# Patient Record
Sex: FEMALE | Race: BLACK OR AFRICAN AMERICAN | NOT HISPANIC OR LATINO | Employment: OTHER | URBAN - METROPOLITAN AREA
[De-identification: names, ages, dates, MRNs, and addresses within clinical notes are randomized per-mention and may not be internally consistent; named-entity substitution may affect disease eponyms.]

---

## 2023-03-01 ENCOUNTER — HOSPITAL ENCOUNTER (INPATIENT)
Facility: HOSPITAL | Age: 73
LOS: 5 days | Discharge: HOME HEALTH | DRG: 123 | End: 2023-03-06
Attending: EMERGENCY MEDICINE | Admitting: INTERNAL MEDICINE
Payer: MEDICARE

## 2023-03-01 DIAGNOSIS — H55.09 NYSTAGMUS ASSOCIATED WITH CENTRAL VESTIBULAR DISORDER: ICD-10-CM

## 2023-03-01 DIAGNOSIS — I63.81 BASAL GANGLIA INFARCTION: Primary | ICD-10-CM

## 2023-03-01 DIAGNOSIS — I10 BENIGN ESSENTIAL HYPERTENSION: ICD-10-CM

## 2023-03-01 DIAGNOSIS — R53.1 ACUTE LEFT-SIDED WEAKNESS: ICD-10-CM

## 2023-03-01 DIAGNOSIS — I63.9 CVA (CEREBRAL VASCULAR ACCIDENT): ICD-10-CM

## 2023-03-01 DIAGNOSIS — H49.02 LEFT OCULOMOTOR NERVE PALSY: ICD-10-CM

## 2023-03-01 DIAGNOSIS — H49.00 OCULOMOTOR NERVE PALSY, UNSPECIFIED LATERALITY: ICD-10-CM

## 2023-03-01 DIAGNOSIS — R53.1 WEAKNESS: ICD-10-CM

## 2023-03-01 DIAGNOSIS — H53.2 DIPLOPIA: ICD-10-CM

## 2023-03-01 LAB
ALBUMIN SERPL-MCNC: 4.1 G/DL (ref 3.4–4.8)
ALBUMIN/GLOB SERPL: 1.1 RATIO (ref 1.1–2)
ALP SERPL-CCNC: 126 UNIT/L (ref 40–150)
ALT SERPL-CCNC: 19 UNIT/L (ref 0–55)
AMORPH PHOS CRY URNS QL MICRO: ABNORMAL /HPF
APPEARANCE UR: ABNORMAL
AST SERPL-CCNC: 19 UNIT/L (ref 5–34)
BACTERIA #/AREA URNS AUTO: ABNORMAL /HPF
BASOPHILS # BLD AUTO: 0.03 X10(3)/MCL (ref 0–0.2)
BASOPHILS NFR BLD AUTO: 0.3 %
BILIRUB UR QL STRIP.AUTO: NEGATIVE MG/DL
BILIRUBIN DIRECT+TOT PNL SERPL-MCNC: 0.3 MG/DL
BUN SERPL-MCNC: 8.7 MG/DL (ref 9.8–20.1)
CALCIUM SERPL-MCNC: 9.8 MG/DL (ref 8.4–10.2)
CHLORIDE SERPL-SCNC: 109 MMOL/L (ref 98–107)
CO2 SERPL-SCNC: 22 MMOL/L (ref 23–31)
COLOR UR AUTO: YELLOW
CREAT SERPL-MCNC: 0.74 MG/DL (ref 0.55–1.02)
EOSINOPHIL # BLD AUTO: 0.2 X10(3)/MCL (ref 0–0.9)
EOSINOPHIL NFR BLD AUTO: 2.3 %
ERYTHROCYTE [DISTWIDTH] IN BLOOD BY AUTOMATED COUNT: 12.9 % (ref 11.5–17)
GFR SERPLBLD CREATININE-BSD FMLA CKD-EPI: >60 MLS/MIN/1.73/M2
GLOBULIN SER-MCNC: 3.7 GM/DL (ref 2.4–3.5)
GLUCOSE SERPL-MCNC: 122 MG/DL (ref 82–115)
GLUCOSE UR QL STRIP.AUTO: NEGATIVE MG/DL
HCT VFR BLD AUTO: 39.5 % (ref 37–47)
HGB BLD-MCNC: 12.6 G/DL (ref 12–16)
IMM GRANULOCYTES # BLD AUTO: 0.03 X10(3)/MCL (ref 0–0.04)
IMM GRANULOCYTES NFR BLD AUTO: 0.3 %
INR BLD: 0.93 (ref 0–1.3)
KETONES UR QL STRIP.AUTO: NEGATIVE MG/DL
LEUKOCYTE ESTERASE UR QL STRIP.AUTO: NEGATIVE UNIT/L
LIPASE SERPL-CCNC: 13 U/L
LYMPHOCYTES # BLD AUTO: 2.75 X10(3)/MCL (ref 0.6–4.6)
LYMPHOCYTES NFR BLD AUTO: 31.6 %
MCH RBC QN AUTO: 29.1 PG
MCHC RBC AUTO-ENTMCNC: 31.9 G/DL (ref 33–36)
MCV RBC AUTO: 91.2 FL (ref 80–94)
MONOCYTES # BLD AUTO: 0.39 X10(3)/MCL (ref 0.1–1.3)
MONOCYTES NFR BLD AUTO: 4.5 %
NEUTROPHILS # BLD AUTO: 5.31 X10(3)/MCL (ref 2.1–9.2)
NEUTROPHILS NFR BLD AUTO: 61 %
NITRITE UR QL STRIP.AUTO: NEGATIVE
NRBC BLD AUTO-RTO: 0 %
PH UR STRIP.AUTO: 7.5 [PH]
PLATELET # BLD AUTO: 257 X10(3)/MCL (ref 130–400)
PMV BLD AUTO: 10.9 FL (ref 7.4–10.4)
POTASSIUM SERPL-SCNC: 3.6 MMOL/L (ref 3.5–5.1)
PROT SERPL-MCNC: 7.8 GM/DL (ref 5.8–7.6)
PROT UR QL STRIP.AUTO: NEGATIVE MG/DL
PROTHROMBIN TIME: 12.4 SECONDS (ref 12.5–14.5)
RBC # BLD AUTO: 4.33 X10(6)/MCL (ref 4.2–5.4)
RBC #/AREA URNS AUTO: ABNORMAL /HPF
RBC UR QL AUTO: NEGATIVE UNIT/L
SODIUM SERPL-SCNC: 141 MMOL/L (ref 136–145)
SP GR UR STRIP.AUTO: 1.01 (ref 1–1.03)
SQUAMOUS #/AREA URNS AUTO: ABNORMAL /HPF
TROPONIN I SERPL-MCNC: <0.01 NG/ML (ref 0–0.04)
TSH SERPL-ACNC: 6.92 UIU/ML (ref 0.35–4.94)
UROBILINOGEN UR STRIP-ACNC: 0.2 MG/DL
WBC # SPEC AUTO: 8.7 X10(3)/MCL (ref 4.5–11.5)
WBC #/AREA URNS AUTO: ABNORMAL /HPF

## 2023-03-01 PROCEDURE — 81001 URINALYSIS AUTO W/SCOPE: CPT

## 2023-03-01 PROCEDURE — 11000001 HC ACUTE MED/SURG PRIVATE ROOM

## 2023-03-01 PROCEDURE — 63600175 PHARM REV CODE 636 W HCPCS: Performed by: EMERGENCY MEDICINE

## 2023-03-01 PROCEDURE — 84484 ASSAY OF TROPONIN QUANT: CPT

## 2023-03-01 PROCEDURE — 84443 ASSAY THYROID STIM HORMONE: CPT

## 2023-03-01 PROCEDURE — 85025 COMPLETE CBC W/AUTO DIFF WBC: CPT

## 2023-03-01 PROCEDURE — 93010 ELECTROCARDIOGRAM REPORT: CPT | Mod: ,,, | Performed by: INTERNAL MEDICINE

## 2023-03-01 PROCEDURE — 93010 EKG 12-LEAD: ICD-10-PCS | Mod: ,,, | Performed by: INTERNAL MEDICINE

## 2023-03-01 PROCEDURE — 83690 ASSAY OF LIPASE: CPT

## 2023-03-01 PROCEDURE — 25500020 PHARM REV CODE 255: Performed by: EMERGENCY MEDICINE

## 2023-03-01 PROCEDURE — 93005 ELECTROCARDIOGRAM TRACING: CPT

## 2023-03-01 PROCEDURE — 99285 EMERGENCY DEPT VISIT HI MDM: CPT | Mod: 25

## 2023-03-01 PROCEDURE — 85610 PROTHROMBIN TIME: CPT

## 2023-03-01 PROCEDURE — 96374 THER/PROPH/DIAG INJ IV PUSH: CPT

## 2023-03-01 PROCEDURE — 80053 COMPREHEN METABOLIC PANEL: CPT

## 2023-03-01 RX ORDER — ONDANSETRON 2 MG/ML
4 INJECTION INTRAMUSCULAR; INTRAVENOUS
Status: COMPLETED | OUTPATIENT
Start: 2023-03-01 | End: 2023-03-01

## 2023-03-01 RX ORDER — LABETALOL HYDROCHLORIDE 5 MG/ML
10 INJECTION, SOLUTION INTRAVENOUS
Status: DISCONTINUED | OUTPATIENT
Start: 2023-03-01 | End: 2023-03-01

## 2023-03-01 RX ADMIN — IOPAMIDOL 100 ML: 755 INJECTION, SOLUTION INTRAVENOUS at 10:03

## 2023-03-01 RX ADMIN — ONDANSETRON 4 MG: 2 INJECTION INTRAMUSCULAR; INTRAVENOUS at 09:03

## 2023-03-02 PROBLEM — H49.00 OCULOMOTOR NERVE PALSY: Status: ACTIVE | Noted: 2023-03-02

## 2023-03-02 LAB
ANION GAP SERPL CALC-SCNC: 11 MEQ/L
APPEARANCE CSF: CLEAR
AV INDEX (PROSTH): 0.9
AV MEAN GRADIENT: 3 MMHG
AV PEAK GRADIENT: 6 MMHG
AV VELOCITY RATIO: 0.97
BUN SERPL-MCNC: 8 MG/DL (ref 9.8–20.1)
C GATTII+NEOFOR DNA CSF QL NAA+NON-PROBE: NOT DETECTED
CALCIUM SERPL-MCNC: 9.6 MG/DL (ref 8.4–10.2)
CHLORIDE SERPL-SCNC: 108 MMOL/L (ref 98–107)
CHOLEST SERPL-MCNC: 210 MG/DL
CHOLEST/HDLC SERPL: 5 {RATIO} (ref 0–5)
CMV DNA CSF QL NAA+NON-PROBE: NOT DETECTED
CO2 SERPL-SCNC: 23 MMOL/L (ref 23–31)
COLOR CSF: COLORLESS
CREAT SERPL-MCNC: 0.69 MG/DL (ref 0.55–1.02)
CREAT/UREA NIT SERPL: 12
CRP SERPL HS-MCNC: 3.97 MG/L
CV ECHO LV RWT: 0.49 CM
DOP CALC AO PEAK VEL: 1.26 M/S
DOP CALC AO VTI: 22.9 CM
DOP CALC LVOT PEAK VEL: 1.22 M/S
DOP CALCLVOT PEAK VEL VTI: 20.7 CM
E COLI K1 DNA CSF QL NAA+NON-PROBE: NOT DETECTED
E WAVE DECELERATION TIME: 214 MSEC
E/A RATIO: 0.71
E/E' RATIO: 7.88 M/S
ECHO LV POSTERIOR WALL: 1.06 CM (ref 0.6–1.1)
EJECTION FRACTION: 65 %
ERYTHROCYTE [DISTWIDTH] IN BLOOD BY AUTOMATED COUNT: 13.2 % (ref 11.5–17)
ERYTHROCYTE [SEDIMENTATION RATE] IN BLOOD: 34 MM/HR (ref 0–20)
EST. AVERAGE GLUCOSE BLD GHB EST-MCNC: 102.5 MG/DL
EV RNA CSF QL NAA+NON-PROBE: NOT DETECTED
FRACTIONAL SHORTENING: 47 % (ref 28–44)
GFR SERPLBLD CREATININE-BSD FMLA CKD-EPI: >60 MLS/MIN/1.73/M2
GLUCOSE CSF-MCNC: 66 MG/DL (ref 40–70)
GLUCOSE SERPL-MCNC: 101 MG/DL (ref 82–115)
GP B STREP DNA CSF QL NAA+NON-PROBE: NOT DETECTED
GRAM STN SPEC: NORMAL
HAEM INFLU DNA CSF QL NAA+NON-PROBE: NOT DETECTED
HBA1C MFR BLD: 5.2 %
HCT VFR BLD AUTO: 37.4 % (ref 37–47)
HDLC SERPL-MCNC: 42 MG/DL (ref 35–60)
HGB BLD-MCNC: 12.1 G/DL (ref 12–16)
HHV6 DNA CSF QL NAA+NON-PROBE: NOT DETECTED
HSV1 DNA CSF QL NAA+NON-PROBE: NOT DETECTED
HSV2 DNA CSF QL NAA+NON-PROBE: NOT DETECTED
INDIA INK PREP CSF: NEGATIVE
INTERVENTRICULAR SEPTUM: 0.99 CM (ref 0.6–1.1)
L MONOCYTOG DNA CSF QL NAA+NON-PROBE: NOT DETECTED
LDLC SERPL CALC-MCNC: 152 MG/DL (ref 50–140)
LEFT ATRIUM SIZE: 3.9 CM
LEFT ATRIUM VOLUME MOD: 26.5 CM3
LEFT INTERNAL DIMENSION IN SYSTOLE: 2.28 CM (ref 2.1–4)
LEFT VENTRICLE DIASTOLIC VOLUME: 84.9 ML
LEFT VENTRICLE SYSTOLIC VOLUME: 17.7 ML
LEFT VENTRICULAR INTERNAL DIMENSION IN DIASTOLE: 4.34 CM (ref 3.5–6)
LEFT VENTRICULAR MASS: 149.67 G
LV LATERAL E/E' RATIO: 6.3 M/S
LV SEPTAL E/E' RATIO: 10.5 M/S
LVOT MG: 3 MMHG
LVOT MV: 0.79 CM/S
MAGNESIUM SERPL-MCNC: 2.1 MG/DL (ref 1.6–2.6)
MCH RBC QN AUTO: 29.6 PG
MCHC RBC AUTO-ENTMCNC: 32.4 G/DL (ref 33–36)
MCV RBC AUTO: 91.4 FL (ref 80–94)
MV PEAK A VEL: 0.89 M/S
MV PEAK E VEL: 0.63 M/S
N MEN DNA CSF QL NAA+NON-PROBE: NOT DETECTED
NRBC BLD AUTO-RTO: 0 %
PARECHOVIRUS A RNA CSF QL NAA+NON-PROBE: NOT DETECTED
PHOSPHATE SERPL-MCNC: 3.9 MG/DL (ref 2.3–4.7)
PLATELET # BLD AUTO: 239 X10(3)/MCL (ref 130–400)
PMV BLD AUTO: 11.3 FL (ref 7.4–10.4)
POCT GLUCOSE: 103 MG/DL (ref 70–110)
POTASSIUM SERPL-SCNC: 4 MMOL/L (ref 3.5–5.1)
PROT CSF-MCNC: 25.5 MG/DL (ref 15–45)
PV PEAK VELOCITY: 0.93 CM/S
RBC # BLD AUTO: 4.09 X10(6)/MCL (ref 4.2–5.4)
RBC # CSF MANUAL: 60 /UL
S PNEUM DNA CSF QL NAA+NON-PROBE: NOT DETECTED
SODIUM SERPL-SCNC: 142 MMOL/L (ref 136–145)
SPECIMEN VOL CSF: 1 ML
T4 FREE SERPL-MCNC: 0.7 NG/DL (ref 0.7–1.48)
TDI LATERAL: 0.1 M/S
TDI SEPTAL: 0.06 M/S
TDI: 0.08 M/S
TRICUSPID ANNULAR PLANE SYSTOLIC EXCURSION: 1.57 CM
TRIGL SERPL-MCNC: 79 MG/DL (ref 37–140)
VLDLC SERPL CALC-MCNC: 16 MG/DL
VZV DNA CSF QL NAA+NON-PROBE: NOT DETECTED
WBC # CSF MANUAL: 0 /UL (ref 0–5)
WBC # SPEC AUTO: 7.8 X10(3)/MCL (ref 4.5–11.5)

## 2023-03-02 PROCEDURE — 25500020 PHARM REV CODE 255: Performed by: INTERNAL MEDICINE

## 2023-03-02 PROCEDURE — 86301 IMMUNOASSAY TUMOR CA 19-9: CPT | Performed by: INTERNAL MEDICINE

## 2023-03-02 PROCEDURE — 87205 SMEAR GRAM STAIN: CPT | Performed by: NURSE PRACTITIONER

## 2023-03-02 PROCEDURE — A9577 INJ MULTIHANCE: HCPCS | Performed by: INTERNAL MEDICINE

## 2023-03-02 PROCEDURE — 84100 ASSAY OF PHOSPHORUS: CPT | Performed by: INTERNAL MEDICINE

## 2023-03-02 PROCEDURE — 86141 C-REACTIVE PROTEIN HS: CPT | Performed by: INTERNAL MEDICINE

## 2023-03-02 PROCEDURE — 86592 SYPHILIS TEST NON-TREP QUAL: CPT | Performed by: NURSE PRACTITIONER

## 2023-03-02 PROCEDURE — 80061 LIPID PANEL: CPT | Performed by: INTERNAL MEDICINE

## 2023-03-02 PROCEDURE — 97166 OT EVAL MOD COMPLEX 45 MIN: CPT

## 2023-03-02 PROCEDURE — 63600175 PHARM REV CODE 636 W HCPCS: Performed by: INTERNAL MEDICINE

## 2023-03-02 PROCEDURE — 84157 ASSAY OF PROTEIN OTHER: CPT | Performed by: NURSE PRACTITIONER

## 2023-03-02 PROCEDURE — 25000003 PHARM REV CODE 250: Performed by: INTERNAL MEDICINE

## 2023-03-02 PROCEDURE — 83735 ASSAY OF MAGNESIUM: CPT | Performed by: INTERNAL MEDICINE

## 2023-03-02 PROCEDURE — 99223 PR INITIAL HOSPITAL CARE,LEVL III: ICD-10-PCS | Mod: ,,, | Performed by: PSYCHIATRY & NEUROLOGY

## 2023-03-02 PROCEDURE — 85651 RBC SED RATE NONAUTOMATED: CPT | Performed by: INTERNAL MEDICINE

## 2023-03-02 PROCEDURE — 83036 HEMOGLOBIN GLYCOSYLATED A1C: CPT | Performed by: INTERNAL MEDICINE

## 2023-03-02 PROCEDURE — 82378 CARCINOEMBRYONIC ANTIGEN: CPT | Performed by: INTERNAL MEDICINE

## 2023-03-02 PROCEDURE — 80048 BASIC METABOLIC PNL TOTAL CA: CPT | Performed by: INTERNAL MEDICINE

## 2023-03-02 PROCEDURE — 86304 IMMUNOASSAY TUMOR CA 125: CPT | Performed by: INTERNAL MEDICINE

## 2023-03-02 PROCEDURE — 92523 SPEECH SOUND LANG COMPREHEN: CPT

## 2023-03-02 PROCEDURE — 82945 GLUCOSE OTHER FLUID: CPT | Performed by: NURSE PRACTITIONER

## 2023-03-02 PROCEDURE — 87210 SMEAR WET MOUNT SALINE/INK: CPT | Performed by: NURSE PRACTITIONER

## 2023-03-02 PROCEDURE — 84439 ASSAY OF FREE THYROXINE: CPT | Performed by: INTERNAL MEDICINE

## 2023-03-02 PROCEDURE — 89051 BODY FLUID CELL COUNT: CPT | Performed by: NURSE PRACTITIONER

## 2023-03-02 PROCEDURE — 99223 1ST HOSP IP/OBS HIGH 75: CPT | Mod: ,,, | Performed by: PSYCHIATRY & NEUROLOGY

## 2023-03-02 PROCEDURE — 85027 COMPLETE CBC AUTOMATED: CPT | Performed by: INTERNAL MEDICINE

## 2023-03-02 PROCEDURE — 11000001 HC ACUTE MED/SURG PRIVATE ROOM

## 2023-03-02 PROCEDURE — 87102 FUNGUS ISOLATION CULTURE: CPT | Performed by: NURSE PRACTITIONER

## 2023-03-02 PROCEDURE — 87070 CULTURE OTHR SPECIMN AEROBIC: CPT | Performed by: NURSE PRACTITIONER

## 2023-03-02 PROCEDURE — 87483 CNS DNA AMP PROBE TYPE 12-25: CPT | Performed by: INTERNAL MEDICINE

## 2023-03-02 PROCEDURE — 21400001 HC TELEMETRY ROOM

## 2023-03-02 PROCEDURE — 82105 ALPHA-FETOPROTEIN SERUM: CPT | Performed by: INTERNAL MEDICINE

## 2023-03-02 RX ORDER — ASPIRIN 325 MG
325 TABLET, DELAYED RELEASE (ENTERIC COATED) ORAL ONCE
Status: COMPLETED | OUTPATIENT
Start: 2023-03-02 | End: 2023-03-02

## 2023-03-02 RX ORDER — ESOMEPRAZOLE MAGNESIUM 40 MG/1
40 CAPSULE, DELAYED RELEASE ORAL
COMMUNITY

## 2023-03-02 RX ORDER — POLYETHYLENE GLYCOL 3350 17 G/17G
17 POWDER, FOR SOLUTION ORAL 2 TIMES DAILY PRN
Status: DISCONTINUED | OUTPATIENT
Start: 2023-03-03 | End: 2023-03-06 | Stop reason: HOSPADM

## 2023-03-02 RX ORDER — ACETAMINOPHEN 325 MG/1
650 TABLET ORAL EVERY 6 HOURS PRN
Status: DISCONTINUED | OUTPATIENT
Start: 2023-03-02 | End: 2023-03-06 | Stop reason: HOSPADM

## 2023-03-02 RX ORDER — CYCLOBENZAPRINE HCL 5 MG
10 TABLET ORAL NIGHTLY PRN
COMMUNITY

## 2023-03-02 RX ORDER — FAMOTIDINE 20 MG/1
20 TABLET, FILM COATED ORAL DAILY
Status: DISCONTINUED | OUTPATIENT
Start: 2023-03-02 | End: 2023-03-02

## 2023-03-02 RX ORDER — MIRABEGRON 50 MG/1
1 TABLET, FILM COATED, EXTENDED RELEASE ORAL DAILY
COMMUNITY

## 2023-03-02 RX ORDER — CYCLOBENZAPRINE HCL 10 MG
10 TABLET ORAL NIGHTLY PRN
Status: DISCONTINUED | OUTPATIENT
Start: 2023-03-02 | End: 2023-03-06 | Stop reason: HOSPADM

## 2023-03-02 RX ORDER — ASPIRIN 81 MG/1
81 TABLET ORAL DAILY
Status: DISCONTINUED | OUTPATIENT
Start: 2023-03-02 | End: 2023-03-02

## 2023-03-02 RX ORDER — ENOXAPARIN SODIUM 100 MG/ML
40 INJECTION SUBCUTANEOUS EVERY 24 HOURS
Status: DISCONTINUED | OUTPATIENT
Start: 2023-03-02 | End: 2023-03-06 | Stop reason: HOSPADM

## 2023-03-02 RX ORDER — CHOLECALCIFEROL (VITAMIN D3) 25 MCG
2000 TABLET ORAL DAILY
COMMUNITY

## 2023-03-02 RX ORDER — PROCHLORPERAZINE EDISYLATE 5 MG/ML
5 INJECTION INTRAMUSCULAR; INTRAVENOUS EVERY 6 HOURS PRN
Status: DISCONTINUED | OUTPATIENT
Start: 2023-03-02 | End: 2023-03-06 | Stop reason: HOSPADM

## 2023-03-02 RX ORDER — LOSARTAN POTASSIUM 50 MG/1
100 TABLET ORAL DAILY
Status: DISCONTINUED | OUTPATIENT
Start: 2023-03-02 | End: 2023-03-06 | Stop reason: HOSPADM

## 2023-03-02 RX ORDER — AMLODIPINE BESYLATE 5 MG/1
10 TABLET ORAL DAILY
Status: DISCONTINUED | OUTPATIENT
Start: 2023-03-02 | End: 2023-03-06 | Stop reason: HOSPADM

## 2023-03-02 RX ORDER — LOSARTAN POTASSIUM 100 MG/1
100 TABLET ORAL DAILY
COMMUNITY

## 2023-03-02 RX ORDER — ONDANSETRON 2 MG/ML
4 INJECTION INTRAMUSCULAR; INTRAVENOUS EVERY 4 HOURS PRN
Status: DISCONTINUED | OUTPATIENT
Start: 2023-03-02 | End: 2023-03-06 | Stop reason: HOSPADM

## 2023-03-02 RX ORDER — ATORVASTATIN CALCIUM 40 MG/1
40 TABLET, FILM COATED ORAL DAILY
Status: DISCONTINUED | OUTPATIENT
Start: 2023-03-02 | End: 2023-03-06 | Stop reason: HOSPADM

## 2023-03-02 RX ORDER — AMLODIPINE BESYLATE 10 MG/1
10 TABLET ORAL DAILY
COMMUNITY

## 2023-03-02 RX ORDER — LABETALOL HYDROCHLORIDE 5 MG/ML
10 INJECTION, SOLUTION INTRAVENOUS EVERY 30 MIN PRN
Status: DISCONTINUED | OUTPATIENT
Start: 2023-03-02 | End: 2023-03-06 | Stop reason: HOSPADM

## 2023-03-02 RX ORDER — PANTOPRAZOLE SODIUM 40 MG/1
40 TABLET, DELAYED RELEASE ORAL DAILY
Status: DISCONTINUED | OUTPATIENT
Start: 2023-03-02 | End: 2023-03-06 | Stop reason: HOSPADM

## 2023-03-02 RX ORDER — SODIUM CHLORIDE 0.9 % (FLUSH) 0.9 %
10 SYRINGE (ML) INJECTION
Status: DISCONTINUED | OUTPATIENT
Start: 2023-03-02 | End: 2023-03-06 | Stop reason: HOSPADM

## 2023-03-02 RX ORDER — AMOXICILLIN 250 MG
1 CAPSULE ORAL 2 TIMES DAILY PRN
Status: DISCONTINUED | OUTPATIENT
Start: 2023-03-03 | End: 2023-03-06 | Stop reason: HOSPADM

## 2023-03-02 RX ORDER — BISACODYL 10 MG
10 SUPPOSITORY, RECTAL RECTAL DAILY PRN
Status: DISCONTINUED | OUTPATIENT
Start: 2023-03-02 | End: 2023-03-06 | Stop reason: HOSPADM

## 2023-03-02 RX ADMIN — Medication 81 MG: at 08:03

## 2023-03-02 RX ADMIN — ATORVASTATIN CALCIUM 40 MG: 40 TABLET, FILM COATED ORAL at 08:03

## 2023-03-02 RX ADMIN — ASPIRIN 325 MG: 325 TABLET, COATED ORAL at 03:03

## 2023-03-02 RX ADMIN — AMLODIPINE BESYLATE 10 MG: 5 TABLET ORAL at 08:03

## 2023-03-02 RX ADMIN — ENOXAPARIN SODIUM 40 MG: 40 INJECTION SUBCUTANEOUS at 06:03

## 2023-03-02 RX ADMIN — PANTOPRAZOLE SODIUM 40 MG: 40 TABLET, DELAYED RELEASE ORAL at 08:03

## 2023-03-02 RX ADMIN — GADOBENATE DIMEGLUMINE 17 ML: 529 INJECTION, SOLUTION INTRAVENOUS at 09:03

## 2023-03-02 RX ADMIN — LOSARTAN POTASSIUM 100 MG: 50 TABLET, FILM COATED ORAL at 08:03

## 2023-03-02 NOTE — CONSULTS
"[facility] - 9th Floor Med Surg  Neurology  Consult Note    Patient Name: Porsche Alston  MRN: [id number]  Admission Date: 3/1/2023  Hospital Length of Stay: 1 days  Code Status: Full Code   Attending Provider: Mary Cerda MD   Consulting Provider: SADIE Marti  Primary Care Physician: Mabel De NP  Principal Problem:Oculomotor nerve palsy    Inpatient consult to Neurology  Consult performed by: SADIE Marti  Consult ordered by: Mary Cerda MD         Subjective:     Chief Complaint: Double vision    HPI:   72-year-old female with past medical history of HTN, tobacco use, presented to ED on 3/1 with reports of diplopia and generalized weakness.  She also reports "dragging left side" for quite some time and started following back surgery. CTh showed left basal ganglia lacunar infarct of indeterminate age.  She was admitted to Hospitalist service and neurology was consulted for stroke workup.       History reviewed. No pertinent past medical history.    No past surgical history on file.    Review of patient's allergies indicates:  No Known Allergies    Current Neurological Medications:     No current facility-administered medications on file prior to encounter.     Current Outpatient Medications on File Prior to Encounter   Medication Sig    amLODIPine (NORVASC) 10 MG tablet Take 10 mg by mouth once daily.    cyclobenzaprine (FLEXERIL) 5 MG tablet Take 10 mg by mouth nightly as needed for Muscle spasms.    esomeprazole (NEXIUM) 40 MG capsule Take 40 mg by mouth before breakfast.    losartan (COZAAR) 100 MG tablet Take 100 mg by mouth once daily.    mirabegron (MYRBETRIQ) 50 mg Tb24 Take by mouth.    vitamin D (VITAMIN D3) 1000 units Tab Take 2,000 Units by mouth once daily.     Family History    None       Tobacco Use    Smoking status: Unknown    Smokeless tobacco: Not on file   Substance and Sexual Activity    Alcohol use: Not Currently    Drug use: Not Currently    Sexual " activity: Not on file     Review of Systems   Constitutional:  Positive for activity change.   Eyes:  Positive for visual disturbance (double vision).   Neurological:  Positive for dizziness, weakness and light-headedness.   All other systems reviewed and are negative.  Objective:     Vital Signs (Most Recent):  Temp: 98 °F (36.7 °C) (03/02/23 1132)  Pulse: 80 (03/02/23 1132)  Resp: 19 (03/02/23 1011)  BP: (!) 168/84 (03/02/23 1132)  SpO2: 99 % (03/02/23 1132)   Vital Signs (24h Range):  Temp:  [98 °F (36.7 °C)-98.5 °F (36.9 °C)] 98 °F (36.7 °C)  Pulse:  [80-94] 80  Resp:  [14-20] 19  SpO2:  [97 %-100 %] 99 %  BP: (143-187)/() 168/84     Weight: 83.9 kg (185 lb)  There is no height or weight on file to calculate BMI.    Physical Exam  HENT:      Head: Normocephalic.      Nose: Nose normal.   Eyes:      Comments: Third nerve palsy of left eye and bilateral eye torsion nystagmus   Pulmonary:      Effort: Pulmonary effort is normal.   Abdominal:      Palpations: Abdomen is soft.   Musculoskeletal:      Cervical back: Normal range of motion.   Skin:     General: Skin is warm and dry.      Capillary Refill: Capillary refill takes less than 2 seconds.   Neurological:      Mental Status: She is alert and oriented to person, place, and time.   Psychiatric:         Mood and Affect: Mood normal.         Speech: Speech normal.       NEUROLOGICAL EXAMINATION:     MENTAL STATUS   Oriented to person, place, and time.   Attention: normal. Concentration: normal.   Speech: speech is normal   Level of consciousness: alert    CRANIAL NERVES     CN III, IV, VI   CN III: left CN III palsy  Nystagmus type: torsional nystagmus of bilateral eye.  Diplopia: vertical    CN V   Facial sensation intact.     CN VII   Facial expression full, symmetric.     CN IX, X   CN IX normal.   CN X normal.     CN XI   CN XI normal.     CN XII   CN XII normal.     MOTOR EXAM   Muscle bulk: normal  Overall muscle tone: normal    Strength   Right  deltoid: 5/5  Left deltoid: 4/5  Right biceps: 5/5  Left biceps: 4/5  Right triceps: 5/5  Left triceps: 4/5  Right quadriceps: 2/5  Left quadriceps: 1/5  Right hamstrin/5  Left hamstrin/5  Right glutei: 2/5  Left glutei: 1/5    SENSORY EXAM   Light touch normal.     Significant Labs: CBC:   Recent Labs   Lab 23  0542   WBC 8.7 7.8   HGB 12.6 12.1   HCT 39.5 37.4    239     CMP:   Recent Labs   Lab 23  0542    142   K 3.6 4.0   CO2 22* 23   BUN 8.7* 8.0*   CREATININE 0.74 0.69   CALCIUM 9.8 9.6   MG  --  2.10   ALBUMIN 4.1  --    BILITOT 0.3  --    ALKPHOS 126  --    AST 19  --    ALT 19  --        Significant Imaging: I have reviewed all pertinent imaging results/findings within the past 24 hours.    Assessment and Plan:     * Oculomotor nerve palsy  Diplopia - r/o stroke    MRI brain negative for acute infarct ... does have old strokes present on imaging.  Continue ASA 81mg daily  Continue Atorvastatin 40mg daily  PT/OT/ST to evaluate  Consider LINQ placement prior to hospital discharge.    Order MRI C- spine and L-spine  LP ordered ... including cytology, syphilis VRDL    Consider CT chest due to c/o chest pain and long smoking history .... defer to primary team.      Antithrombotics for secondary stroke prevention: Antiplatelets: Aspirin: 81 mg daily    Statins for secondary stroke prevention and hyperlipidemia, if present: Statins: Atorvastatin- 40 mg daily    Aggressive risk factor modification: HTN, Smoking, HLD     Rehab efforts: The patient has been evaluated by a stroke team provider and the therapy needs have been fully considered based off the presenting complaints and exam findings. The following therapy evaluations are needed: PT evaluate and treat, OT evaluate and treat, SLP evaluate and treat    Diagnostics ordered/pending: TTE to assess cardiac function/status    Stroke workup:  -CTh:   1.  Left basal ganglia lacunar infarct of  indeterminate age.  2.  Multiple old lacunar infarcts (right basal ganglia, right external capsule the right peritrigonal location), chronic microvascular ischemia and atrophy  -CTA h/n: No flow-limiting stenosis identified  -MRI brain: No acute intracranial findings identified  -ECHO: completed, interpretation pending  -CUS: negative  -LDL: 152  -A1c: 5.2  -TSH: 6.916  -not on antiplatelet, anticoagulation, or statin therapy at home    VTE prophylaxis: Enoxaparin 40 mg SQ every 24 hours  Mechanical prophylaxis: Place SCDs    BP parameters: normalize blood pressure            VTE Risk Mitigation (From admission, onward)           Ordered     enoxaparin injection 40 mg  Daily         03/02/23 0120     Place sequential compression device  Until discontinued         03/02/23 0117                    Thank you for your consult.  Further recommendations to follow by MD.    Savi Fritz, SADIE  Neurology  [facility] - 9th Floor Med Surg

## 2023-03-02 NOTE — H&P
[facility] Hospital Medicine - H&P Note    Patient Name: Porsche Alston  : 1950  MRN: [id number]  PCP: Mabel De NP  Admitting Physician: Mary Cerda MD  Admission Class: IP- Inpatient   Length of Stay: 1  Face-to-Face encounter date: 2023  Code status: Full    Chief Complaint   Diplopia for 1 day, left-sided weakness for 1 week    History of Present Illness   This is a 72-year-old female with medical history of hypertension noncompliant with medications and chronic heavy tobacco smoker more than 45 pack year present to the ED with complaint of diplopia that started today, generalized weakness and dragging her left side for the past week.    On arrival to ED she was afebrile, hypertensive, saturating 98% on room air.  EKG showed normal sinus rhythm.  Chest x-ray unremarkable.  CT head showed left basal ganglia lacunar infarct of indeterminate age as well as multiple old lacunar infarcts involving the right basal ganglia, right external capsule and right peritrigonal location.  CTA head and neck showed no LVO.  Labs unremarkable, A1c 5.2, lipid panel pending.    Referred to hospital medicine service for further evaluation and management.    ROS   Except as documented, all other systems reviewed and negative     Past Medical History   Hypertension    Past Surgical History   Denies prior surgeries    Social History   > 45 pack year smoker  No alcohol or illicit drug use    Family History   Reviewed and negative    Allergies   Patient has no known allergies.    Home Medications     Prior to Admission medications    Medication Sig Start Date End Date Taking? Authorizing Provider   amLODIPine (NORVASC) 10 MG tablet Take 10 mg by mouth once daily.   Yes Historical Provider   cyclobenzaprine (FLEXERIL) 5 MG tablet Take 10 mg by mouth nightly as needed for Muscle spasms.   Yes Historical Provider   esomeprazole (NEXIUM) 40 MG capsule Take 40 mg by mouth before breakfast.   Yes  Historical Provider   losartan (COZAAR) 100 MG tablet Take 100 mg by mouth once daily.   Yes Historical Provider   mirabegron (MYRBETRIQ) 50 mg Tb24 Take by mouth.   Yes Historical Provider        Physical Exam   Vital Signs  Temp:  [98.3 °F (36.8 °C)]   Pulse:  [81-92]   Resp:  [16-18]   BP: (146-187)/()   SpO2:  [98 %-99 %]    General: Appears comfortable  HEENT: NC/AT  Neck:  No JVD  Chest: CTABL  CVS: Regular rhythm. Normal S1/S2.  Abdomen: nondistended, normoactive BS, soft and non-tender.  MSK: No obvious deformity or joint swelling  Skin: Warm and dry  Neuro: AAOx3, left eye deviated outward and downward, weak left hand , left leg drift 4+/5  Psych: Cooperative    Labs     Recent Labs     03/01/23 1921   WBC 8.7   RBC 4.33   HGB 12.6   HCT 39.5   MCV 91.2   MCH 29.1   MCHC 31.9*   RDW 12.9        Recent Labs     03/01/23 1921   PROTIME 12.4*   INR 0.93      Recent Labs     03/01/23 1921      K 3.6   CHLORIDE 109*   CO2 22*   BUN 8.7*   CREATININE 0.74   EGFRNORACEVR >60   GLUCOSE 122*   CALCIUM 9.8   ALBUMIN 4.1   GLOBULIN 3.7*   ALKPHOS 126   ALT 19   AST 19   BILITOT 0.3   LIPASE 13   TSH 6.916*       Recent Labs     03/01/23 1921   TROPONINI <0.010        Microbiology Results (last 7 days)       ** No results found for the last 168 hours. **           Imaging     CTA Head and Neck (xpd)         CT Head Without Contrast   Final Result      1.  Left basal ganglia lacunar infarct of indeterminate age.      2.  Multiple old lacunar infarcts, chronic microvascular ischemia and atrophy.         Electronically signed by: Geovani Jaffe   Date:    03/01/2023   Time:    19:42      X-Ray Chest 1 View   Final Result      NO ACUTE CARDIOPULMONARY PROCESS IDENTIFIED.         Electronically signed by: Geovani Jaffe   Date:    03/01/2023   Time:    19:01      MRI Brain Without Contrast    (Results Pending)     Assessment & Plan   Left 3rd oculomotor palsy  Multiple chronic lacunar  infarct  Uncontrolled hypertension  Chronic tobacco smoker    Plan:    MRI brain without contrast, echo bubble study and carotid ultrasound pending  Aspirin 325 mg x 1 then 81 mg daily  Atorvastatin 40 mg daily  Resume amlodipine and losartan in a.m./out of window for permissive hypertension.  Neurology consult  PT/OT/SLP evaluation  I spent 10 minute on smoking cessation counseling, voiced understanding and willingness to quit.  VTE Prophylaxis:  Enoxaparin 40 mg subQ daily      Mary Cerda MD  Internal Medicine

## 2023-03-02 NOTE — NURSING
Admit profile completed by admit nurse. Med reconciliation verified and as noted in chart. Initial assessment deferred to primary nurse. Pt alert x4, no distress noted, and able to answer all questions appropriately. Family member at bedside. Transport at bedside to take pt to assigned room. Pt and family updated on POC by primary nurse.

## 2023-03-02 NOTE — FIRST PROVIDER EVALUATION
"Medical screening examination initiated.  I have conducted a focused provider triage encounter, findings are as follows:    Brief history of present illness:  72 year old female presents to ER with double vision and blurred vision that started at 1400 today. Patient states she had left leg "heaviness" that started last week. Vomiting started today.     Vitals:    03/01/23 1826   BP: (!) 187/113   Pulse: 92   Resp: 18   Temp: 98.3 °F (36.8 °C)   SpO2: 98%   Weight: 83.9 kg (185 lb)       Pertinent physical exam:  Awake and alert. Left sided weakness    Brief workup plan:  Labs, CT head, CXR    Preliminary workup initiated; this workup will be continued and followed by the physician or advanced practice provider that is assigned to the patient when roomed.  "

## 2023-03-02 NOTE — PT/OT/SLP EVAL
"Occupational Therapy   Evaluation    Name: Porsche Alston  MRN: [id number]  Admitting Diagnosis: Oculomotor nerve palsy  Recent Surgery: * No surgery found *      Recommendations:     Discharge Recommendations: nursing facility, skilled, rehabilitation facility  Discharge Equipment Recommendations:     Barriers to discharge:       Assessment:     Porsche Alston is a 72 y.o. female with a medical diagnosis of Oculomotor nerve palsy, L leg heaviness, multiple chronic lacunar infarcts, uncontrolled htn. MRI negative for acute stroke.  She presents with the following performance deficits affecting function: weakness, impaired endurance, impaired self care skills, impaired functional mobility, gait instability, impaired balance, visual deficits, decreased coordination, decreased upper extremity function.    /83 HR 80  Pt lives at home with  and granddaughter where she reports she occasionally needs assist with ADLs, uses a rollator for ambulation. Today, bed mobility min A, sitting balance SBA, sit <> stand at RW with min A. Pt took side steps along EOB with min A but unable to ambulate to door or bathroom 2/2 c/o increased "heaviness" and lethargy. Pt present with diplopia. Discussed lens tapping for next session with pt and family.     Rehab Prognosis: Good; patient would benefit from acute skilled OT services to address these deficits and reach maximum level of function.       Plan:     Patient to be seen 3 x/week, 5 x/week to address the above listed problems via self-care/home management, therapeutic activities, therapeutic exercises  Plan of Care Expires:    Plan of Care Reviewed with: patient, family    Subjective     Chief Complaint: "heaviness"   Patient/Family Comments/goals: get stronger     Occupational Profile:  Living Environment: 1 story, 4 steps to enter, rail on L, tub shower   Previous level of function: indep with FM, assist for ADLs  Roles and Routines: drives   Equipment Used at Home: " rollator  Assistance upon Discharge: family     Pain/Comfort:  Pain Rating 1: 0/10    Patients cultural, spiritual, Mosque conflicts given the current situation:      Objective:     Communicated with: nrsg prior to session.  Patient found HOB elevated with   upon OT entry to room.    General Precautions: Standard,    Orthopedic Precautions:    Braces:    Respiratory Status: Room air    Occupational Performance:    Bed Mobility:    Patient completed Supine to Sit with minimum assistance  Patient completed Sit to Supine with minimum assistance    Functional Mobility/Transfers:  Patient completed Sit <> Stand Transfer with minimum assistance  with  rolling walker   Functional Mobility: sides steps along bed; unable to ambulate further 2/2 lethargy/weakness    Activities of Daily Living:  Lower Body Dressing: minimum assistance    Toileting: minimum assistance      Cognitive/Visual Perceptual:  Cognitive/Psychosocial Skills:     -       Oriented to: Person, Place, Time, and Situation   Visual/Perceptual:      -Impaired  L eye for tracking ; L lateral deviation with diplopia       Physical Exam:  Sensation:    -       Intact  Upper Extremity Strength:    -       Right Upper Extremity: WFL  -       Left Upper Extremity: -3/5  Fine Motor Coordination:    -       Impaired  Left hand thumb/finger opposition skills      AMPA 6 Click ADL:  AMPA Total Score:      Treatment & Education:  Educated pt and family of lens taping to address diplopia for next session.     Patient left HOB elevated with all lines intact, call button in reach, and nrsg and family present    GOALS:   Multidisciplinary Problems       Occupational Therapy Goals          Problem: Occupational Therapy    Goal Priority Disciplines Outcome Interventions   Occupational Therapy Goal     OT, PT/OT Ongoing, Progressing    Description: Goals to be met by: 3/16/2023     Patient will increase functional independence with ADLs by performing:    UE Dressing with  Modified [place].  LE Dressing with Modified [place].  Grooming while standing with Modified [place].  Toileting from bedside commode with Modified [place] for hygiene and clothing management.   Toilet transfer to toilet with Modified [place].  Increased functional strength to 5/5 for LUE.                         History:     History reviewed. No pertinent past medical history.    No past surgical history on file.    Time Tracking:     OT Date of Treatment:    OT Start Time: 1140  OT Stop Time: 1200  OT Total Time (min): 20 min    Billable Minutes:Evaluation Moderate Complexity     3/2/2023

## 2023-03-02 NOTE — ED PROVIDER NOTES
Encounter Date: 3/1/2023    SCRIBE #1 NOTE: I, Dariana Thu, am scribing for, and in the presence of,  Leticia Smiley MD. I have scribed the following portions of the note - Other sections scribed: HPI,ROS,PE.     History     Chief Complaint   Patient presents with    Blurred Vision     Blurry vision and vomiting started after 1pm, not feeling well for a few days, left leg heaviness since last week. Left eye vision is worse, left eye rolling to left with nstagmus Left upper extremity weaker than right in triage.     Ms. Alston is a 72-year-old female with past medical history of HTN presenting to ED with daughter nearby c/o visual changes and weakness onset today. Daughter reports she noticed pt with unsteady gait around 1500 today, states she then assessed pt. States pt's eye movements were repetitive, following pt had an episode of vomiting.Daughter then presented here concerning CVA, notes Family Hx CVA. Pt reports of double vision, dizziness, HA, and weakness to left arm and leg as well as  sensation. She denies slurred speech. Daughter denies any new medications. Pt follows with  Mabel De NP.     The history is provided by the patient. No  was used.   Neurologic Problem  The primary symptoms include headaches, dizziness, visual change, nausea and vomiting. Primary symptoms do not include fever. The symptoms began today. The symptoms are unchanged. The neurological symptoms are multifocal.   The headache began today. Headache is a new problem. The headache is associated with double vision, visual change and weakness. The headache is not associated with eye pain.   Dizziness also occurs with nausea, vomiting and weakness. Dizziness does not occur with diaphoresis.   The visual change began today. The visual change has been unchanged since its onset. The visual change includes double vision.   Nausea began today.   Additional symptoms include weakness. Medical issues also  include hypertension.   Review of patient's allergies indicates:  No Known Allergies  History reviewed. No pertinent past medical history.  No past surgical history on file.  No family history on file.  Social History     Tobacco Use    Smoking status: Unknown   Substance Use Topics    Alcohol use: Not Currently    Drug use: Not Currently     Review of Systems   Constitutional:  Positive for activity change. Negative for chills, diaphoresis and fever.   HENT:  Negative for congestion, ear pain, sinus pain and sore throat.    Eyes:  Positive for double vision and visual disturbance. Negative for pain and discharge.   Respiratory:  Negative for cough, shortness of breath, wheezing and stridor.    Cardiovascular:  Negative for chest pain and palpitations.   Gastrointestinal:  Positive for nausea and vomiting. Negative for abdominal pain, constipation, diarrhea and rectal pain.   Genitourinary:  Negative for dysuria and hematuria.   Musculoskeletal:  Negative for back pain and myalgias.   Skin:  Negative for rash.   Neurological:  Positive for dizziness, weakness and headaches. Negative for syncope.   Hematological: Negative.    Psychiatric/Behavioral: Negative.     All other systems reviewed and are negative.    Physical Exam     Initial Vitals [03/01/23 1826]   BP Pulse Resp Temp SpO2   (!) 187/113 92 18 98.3 °F (36.8 °C) 98 %      MAP       --         Physical Exam    Nursing note and vitals reviewed.  Constitutional: She appears well-developed and well-nourished. She is not diaphoretic.   HENT:   Head: Normocephalic and atraumatic.   Nose: Nose normal.   Mouth/Throat: Oropharynx is clear and moist.   Eyes: Conjunctivae are normal. Pupils are equal, round, and reactive to light. Left eye exhibits nystagmus.   Significant rhythmic nystagmus. Normal IOP.   Neck: Trachea normal. Neck supple.   Normal range of motion.  Cardiovascular:  Normal rate, regular rhythm, normal heart sounds and intact distal pulses.            No murmur heard.  Pulmonary/Chest: Breath sounds normal. No respiratory distress. She has no wheezes. She has no rhonchi. She has no rales. She exhibits no tenderness.   Abdominal: Abdomen is soft. Bowel sounds are normal. She exhibits no distension. There is no abdominal tenderness. There is no rebound.   Musculoskeletal:         General: No tenderness or edema. Normal range of motion.      Cervical back: Normal range of motion and neck supple.      Lumbar back: Normal. Normal range of motion.     Neurological: She is alert and oriented to person, place, and time. A sensory deficit is present. GCS score is 15. GCS eye subscore is 4. GCS verbal subscore is 5. GCS motor subscore is 6.   Pateint has no facial droop. The right upper extremity greater than left upper extremity with decreased sensation. Poor coordination of finger to nose. Heel to shin limited by strength.    Skin: Skin is warm and dry. Capillary refill takes less than 2 seconds. No rash and no abscess noted. No erythema. No pallor.   Psychiatric: She has a normal mood and affect. Her behavior is normal. Judgment and thought content normal.       ED Course   Procedures  Labs Reviewed   COMPREHENSIVE METABOLIC PANEL - Abnormal; Notable for the following components:       Result Value    Chloride 109 (*)     Carbon Dioxide 22 (*)     Glucose Level 122 (*)     Blood Urea Nitrogen 8.7 (*)     Protein Total 7.8 (*)     Globulin 3.7 (*)     All other components within normal limits   PROTIME-INR - Abnormal; Notable for the following components:    PT 12.4 (*)     All other components within normal limits   URINALYSIS, REFLEX TO URINE CULTURE - Abnormal; Notable for the following components:    Appearance, UA Cloudy (*)     All other components within normal limits   CBC WITH DIFFERENTIAL - Abnormal; Notable for the following components:    MCHC 31.9 (*)     MPV 10.9 (*)     All other components within normal limits   TSH - Abnormal; Notable for the following  components:    Thyroid Stimulating Hormone 6.916 (*)     All other components within normal limits   URINALYSIS, MICROSCOPIC - Abnormal; Notable for the following components:    Amorphous Phosphate Crystals, UA Occasional (*)     All other components within normal limits   TROPONIN I - Normal   LIPASE - Normal   CBC W/ AUTO DIFFERENTIAL    Narrative:     The following orders were created for panel order CBC Auto Differential.  Procedure                               Abnormality         Status                     ---------                               -----------         ------                     CBC with Differential[[id number]]        Abnormal            Final result                 Please view results for these tests on the individual orders.   T4, FREE   HEMOGLOBIN A1C   HIGH SENSITIVITY CRP   SEDIMENTATION RATE   BASIC METABOLIC PANEL   PHOSPHORUS   MAGNESIUM   CBC WITHOUT DIFFERENTIAL   POCT GLUCOSE, HAND-HELD DEVICE     EKG Readings: (Independently Interpreted)   Initial Reading: No STEMI. Ectopy: No Ectopy. Conduction: Normal. ST Segments: Normal ST Segments. T Waves: Normal.   EKG done at 1834: Sinus rhythm at 87bpm     Imaging Results              CTA Head and Neck (xpd) (Preliminary result)  Result time 03/01/23 23:07:31      Preliminary result by Reggie Nolasco Jr., MD (03/01/23 23:07:31)                   Narrative:    START OF REPORT:  TECHNIQUE: CT ANGIOGRAM OF THE INTRACRANIAL VESSELS WAS PERFORMED WITHOUT AND WITH INTRAVENOUS CONTRAST WITH DIRECT AXIAL AS WELL AS SAGITTAL AND CORONAL REFORMATIONS. CT ANGIOGRAM OF THE NECK VESSELS WAS PERFORMED WITHOUT AND WITH INTRAVENOUS CONTRAST WITH DIRECT AXIAL AS WELL AS SAGITTAL AND CORONAL REFORMATIONS.    COMPARISON: COMPARISON IS WITH STUDY DATED2023-03-01 19:21:19.    CLINICAL HISTORY: BLURRED VISION (BLURRY VISION AND VOMITING STARTED AFTER 1PM, NOT FEELING WELL FOR A FEW DAYS, LEFT LEG HEAVINESS SINCE LAST WEEK. LEFT EYE VISION IS WORSE, LEFT EYE  ROLLING TO LEFT WITH NSTAGMUS LEFT UPPER EXTREMITY WEAKER THAN RIGHT IN TRIAGE.).    Findings:  Intracranial Vascular structures:  Internal carotid arteries: Mild atheromatous calcification of the cavernous and supraclinoid segments of the internal carotid artery is seen with mild stenosis of the left supraclinoid segment (series 17, image 259).  Middle cerebral arteries: Unremarkable.  Anterior cerebral arteries: Unremarkable.  Vertebral arteries: Unremarkable.  Basilar artery: Unremarkable.  Posterior cerebral arteries: There is fetal origin of the right posterior cerebral artery with a hypoplastic P1 segment. The left posterior cerebral artery is unremarkable.  Neck Vascular structures: Mild atheromatous calcification of the arch of the aorta is seen.  Carotids:  Common carotid arteries: Unremarkable.  Internal carotid artery: Unremarkable.  Vertebral arteries: Unremarkable.  Brain parenchyma: No abnormal intracranial enhancement is seen on the post contrast images. Again noted are old lacunar infarcts in bilateral basal ganglia.    Miscellaneous: Enlarged thyroid gland with calcified and non-calcified nodules.      Impression:  1. Unremarkable CT angiogram of the head and neck. Details and other findings as described above.                          Preliminary result by [facility], Rad Results In (03/01/23 23:07:31)                   Narrative:    START OF REPORT:  TECHNIQUE: CT ANGIOGRAM OF THE INTRACRANIAL VESSELS WAS PERFORMED WITHOUT AND WITH INTRAVENOUS CONTRAST WITH DIRECT AXIAL AS WELL AS SAGITTAL AND CORONAL REFORMATIONS. CT ANGIOGRAM OF THE NECK VESSELS WAS PERFORMED WITHOUT AND WITH INTRAVENOUS CONTRAST WITH DIRECT AXIAL AS WELL AS SAGITTAL AND CORONAL REFORMATIONS.    COMPARISON: COMPARISON IS WITH STUDY DATED2023-03-01 19:21:19.    CLINICAL HISTORY: BLURRED VISION (BLURRY VISION AND VOMITING STARTED AFTER 1PM, NOT FEELING WELL FOR A FEW DAYS, LEFT LEG HEAVINESS SINCE LAST WEEK. LEFT EYE VISION IS  WORSE, LEFT EYE ROLLING TO LEFT WITH NSTAGMUS LEFT UPPER EXTREMITY WEAKER THAN RIGHT IN TRIAGE.).    Findings:  Intracranial Vascular structures:  Internal carotid arteries: Mild atheromatous calcification of the cavernous and supraclinoid segments of the internal carotid artery is seen with mild stenosis of the left supraclinoid segment (series 17, image 259).  Middle cerebral arteries: Unremarkable.  Anterior cerebral arteries: Unremarkable.  Vertebral arteries: Unremarkable.  Basilar artery: Unremarkable.  Posterior cerebral arteries: There is fetal origin of the right posterior cerebral artery with a hypoplastic P1 segment. The left posterior cerebral artery is unremarkable.  Neck Vascular structures: Mild atheromatous calcification of the arch of the aorta is seen.  Carotids:  Common carotid arteries: Unremarkable.  Internal carotid artery: Unremarkable.  Vertebral arteries: Unremarkable.  Brain parenchyma: No abnormal intracranial enhancement is seen on the post contrast images. Again noted are old lacunar infarcts in bilateral basal ganglia.    Miscellaneous: Enlarged thyroid gland with calcified and non-calcified nodules.      Impression:  1. Unremarkable CT angiogram of the head and neck. Details and other findings as described above.                                         CT Head Without Contrast (Final result)  Result time 03/01/23 19:42:32      Final result by Geovani aJffe MD (03/01/23 19:42:32)                   Impression:      1.  Left basal ganglia lacunar infarct of indeterminate age.    2.  Multiple old lacunar infarcts, chronic microvascular ischemia and atrophy.      Electronically signed by: Geovani Jaffe  Date:    03/01/2023  Time:    19:42               Narrative:    EXAMINATION:  CT HEAD WITHOUT CONTRAST    CLINICAL HISTORY:  Neuro deficit, acute, stroke suspected;    TECHNIQUE:  Sequential axial images were performed of the brain without contrast.    Dose product length of 995 mGycm.  Automated exposure control was utilized to minimize radiation dose.    COMPARISON:  None available.    FINDINGS:  There is no intracranial mass effect, midline shift, hydrocephalus or hemorrhage. There is no sulcal effacement or low attenuation changes to suggest recent large vessel territory infarction.  There are old infarcts involve the right basal ganglia, right external capsule the right peritrigonal location.  There is also left basal ganglia lacunar infarct of indeterminate age.  Chronic appearing periventricular and subcortical white matter low attenuation changes are present and are consistent with chronic microangiopathic ischemia. The ventricular system and sulcal markings prominence is consistent with atrophy. There is no acute extra axial fluid collection. Visualized paranasal sinuses are clear without mucosal thickening, polypoidal abnormality or air-fluid levels. Mastoid air cells aeration is optimal.                                       X-Ray Chest 1 View (Final result)  Result time 03/01/23 19:01:11      Final result by Geovani Jaffe MD (03/01/23 19:01:11)                   Impression:      NO ACUTE CARDIOPULMONARY PROCESS IDENTIFIED.      Electronically signed by: Geovani Jaffe  Date:    03/01/2023  Time:    19:01               Narrative:    EXAMINATION:  XR CHEST 1 VIEW    CLINICAL HISTORY:  Weakness    TECHNIQUE:  One view    COMPARISON:  None available.    FINDINGS:  Cardiopericardial silhouette is mildly enlarged.  Lungs are without dense focal or segmental consolidation, congestive process, pleural effusions or pneumothorax.                                    X-Rays:   Independently Interpreted Readings:   Chest X-Ray: Normal heart size.  No infiltrates.  No acute abnormalities.   Medications   aspirin EC tablet 325 mg (has no administration in time range)   sodium chloride 0.9% flush 10 mL (has no administration in time range)   acetaminophen tablet 650 mg (has no administration in time  range)   labetaloL injection 10 mg (has no administration in time range)   ondansetron injection 4 mg (has no administration in time range)   prochlorperazine injection Soln 5 mg (has no administration in time range)   polyethylene glycol packet 17 g (has no administration in time range)   senna-docusate 8.6-50 mg per tablet 1 tablet (has no administration in time range)   bisacodyL suppository 10 mg (has no administration in time range)   aspirin EC tablet 81 mg (has no administration in time range)   atorvastatin tablet 40 mg (has no administration in time range)   enoxaparin injection 40 mg (has no administration in time range)   famotidine tablet 20 mg (has no administration in time range)   ondansetron injection 4 mg (4 mg Intravenous Given 3/1/23 2145)   iopamidoL (ISOVUE-370) injection 100 mL (100 mLs Intravenous Given 3/1/23 2209)     Medical Decision Making:   History:   I obtained history from: someone other than patient.       <> Summary of History: Daughter at bedside who is an ICU nurse  Initial Assessment:   See HPI  Independently Interpreted Test(s):   I have ordered and independently interpreted EKG Reading(s) - see prior notes  Clinical Tests:   Lab Tests: Ordered and Reviewed  The following lab test(s) were unremarkable: CBC, CMP, PT and PTT  Radiological Study: Ordered and Reviewed  Medical Tests: Ordered and Reviewed  Other:   I have discussed this case with another health care provider.Medical Decision Making  The differential diagnosis includes, but is not limited to: CVA, TIA, electrolyte derangement, intracranial hemorrhage, anemia, glaucoma   To evaluate this CT head, CBC, CMP, coags, TSH, troponin, UA, CT angio head and neck and chest x-ray were ordered and reviewed.  She took a full-dose aspirin prior to arrival in the ED. timeline is somewhat cloudy as she did have some issues ambulating overnight and was out of the window for tPA on arrival.  Was possibly within the window for LVF  however CT angiogram is without acute large vessel occlusion and her original CT showed an infarct.  She will be admitted for routine stroke care and permissive hypertension.  Her labs showed an elevated TSH of undetermined etiology or significance slightly low bicarb and slightly elevated glucose.  Remainder of labs were noncontributory and overall within normal limits    Problems Addressed:  Acute left-sided weakness: acute illness or injury that poses a threat to life or bodily functions  Basal ganglia infarction: acute illness or injury that poses a threat to life or bodily functions  Benign essential hypertension: chronic illness or injury with severe exacerbation, progression, or side effects of treatment  CVA (cerebral vascular accident): acute illness or injury  Diplopia: acute illness or injury that poses a threat to life or bodily functions  Nystagmus associated with central vestibular disorder: acute illness or injury that poses a threat to life or bodily functions  Weakness: acute illness or injury that poses a threat to life or bodily functions    Amount and/or Complexity of Data Reviewed  Labs: ordered. Decision-making details documented in ED Course.  Radiology: ordered. Decision-making details documented in ED Course.  ECG/medicine tests: ordered and independent interpretation performed.    Risk  Prescription drug management.  Decision regarding hospitalization.       Additional MDM:     NIH Stroke Scale:   Interval = baseline (upon arrival/admit)  Level of consciousness = 0 - alert  LOC questions = 0 - answers both correctly  LOC commands = 0 - performs both correctly  Best gaze = 1 - partial gaze palsy  Visual = 0 - no visual loss  Facial palsy = 0 - normal  Motor left arm =  1 - drift  Motor right arm =  0 - no drift  Motor left leg = 2 - can't resist gravity  Motor right leg =  0 - no drift  Limb ataxia = 2 - present in two limbs  Sensory = 1 - mild to moderate loss  Best language = 0 - no  aphasia  Dysarthria = 0 - normal articulation  Extinction and inattention = 0 - no neglect  NIH Stroke Scale Total = 7     Scribe Attestation:   Scribe #1: I performed the above scribed service and the documentation accurately describes the services I performed. I attest to the accuracy of the note.  Comments: Attending:   Physician Attestation Statement for Scribe #1: ILeticia MD, personally performed the services described in this documentation. All medical record entries made by the scribe were at my direction and in my presence.  I have reviewed the chart and agree that the record reflects my personal performance and is accurate and complete.        Attending Attestation:           Physician Attestation for Scribe:  Physician Attestation Statement for Scribe #1: I, Leticia Smiley MD, reviewed documentation, as scribed by Dariana Andino in my presence, and it is both accurate and complete.           ED Course as of 03/02/23 0214   Wed Mar 01, 2023   2156 Patient is within the 24 hour window for LVO potentially; howver, she already has an infarct on her ct [BS]   2303 Took full dose asa pta [BS]   2356 Hospitalist accepted admit [BS]      ED Course User Index  [BS] Leticia Smiley MD                   Clinical Impression:   Final diagnoses:  [R53.1] Weakness  [I63.81] Basal ganglia infarction (Primary)  [I10] Benign essential hypertension  [H53.2] Diplopia  [H55.09] Nystagmus associated with central vestibular disorder  [R53.1] Acute left-sided weakness        ED Disposition Condition    Admit Stable                Leticia Smiley MD  03/02/23 0214

## 2023-03-02 NOTE — PT/OT/SLP EVAL
"Speech Language Pathology Department  Cognitive-Communication Evaluation    Patient Name:  Porsche Alston   MRN:  [id number]    Recommendations:     General recommendations:  SLP intervention not indicated  Communication strategies:  none    History:     Porsche Alston is a/n 72 y.o. female admitted for CVA workup.    History reviewed. No pertinent past medical history.  No past surgical history on file.    Previous level of Function  Education:high school  Occupation: retired  Lives: with spouse  Handed: Right  Glasses: no  Hearing Aids: no    Subjective     Patient awake, alert, and oriented x4 .  Patient goals: "to get better"  Spiritual/Cultural/Faith Beliefs/Practices that affect care: no  Pain/Comfort: Pain Rating 1: 0/10    Objective:     SPEECH PRODUCTION  Phoneme Production: wfl  Voice Quality: wfl  Voice Production: wfl  Speech Rate: wfl  Loudness: wfl  Respiration: wfl  Speech Intelligibility  Known Context: Greater that 90%  Unknown Context: Greater that 90%    AUDITORY COMPREHENSION  Identification:  Body parts: 100%  Objects: 100%  Following Directions:  1-Step: 100%  2-Step: 100%  Yes/No Questions:  Biographical: 100%  Environmental: 100%  Simple: 100%  Complex: 100%    VERBAL EXPRESSION  Automatic Speech:  Functional needs: 100%  Days of the week: 100%    Confrontation Naming  Body Parts: 100%  Objects: 100%  Wh- Questions:  Object name: 100%  Object function: 100%    COGNITION  Orientation:  Person: 100%  Place: 100%  Time: 100%  Memory:  Immediate: 100%  Delayed: 100%  Problem Solving  Functional simple: 100%  Functional complex: 100%  Organization:  Convergent thinkin%  Divergent thinkin%      Assessment:     Pt presents with functional speech and language skills,cognitive linguistic skills note to be at baseline. No skilled SLP intervention is warranted at this time.     Goals:   Multidisciplinary Problems       SLP Goals       Not on file                  Patient Education:     Patient " provided with verbal education regarding POC.  Understanding was verbalized.    Plan:     SLP Follow-Up:      Patient to be seen:      Plan of Care expires:     Plan of Care reviewed with:  patient      Time Tracking:     SLP Treatment Date:    3/2/23  Speech Start Time:   0820  Speech Stop Time:      0835  Speech Total Time (min):   15    Billable minutes:  Evaluation of Speech Sound Production with Comprehension and Expression, 15      03/02/2023

## 2023-03-02 NOTE — PROGRESS NOTES
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 Flexeril, losartan, ppi  On Lovenox for DVT prophylaxis

## 2023-03-02 NOTE — HPI
"72-year-old female with past medical history of HTN, tobacco use, presented to ED on 3/1 with reports of diplopia and generalized weakness.  She also reports "dragging left side" for quite some time and started following back surgery. CTh showed left basal ganglia lacunar infarct of indeterminate age.  She was admitted to Hospitalist service and neurology was consulted for stroke workup.  "

## 2023-03-02 NOTE — ASSESSMENT & PLAN NOTE
Diplopia - r/o stroke    MRI brain negative for acute infarct ... does have old strokes present on imaging.  Continue ASA 81mg daily  Continue Atorvastatin 40mg daily  PT/OT/ST to evaluate      Antithrombotics for secondary stroke prevention: Antiplatelets: Aspirin: 81 mg daily    Statins for secondary stroke prevention and hyperlipidemia, if present: Statins: Atorvastatin- 40 mg daily    Aggressive risk factor modification: HTN, Smoking, HLD     Rehab efforts: The patient has been evaluated by a stroke team provider and the therapy needs have been fully considered based off the presenting complaints and exam findings. The following therapy evaluations are needed: PT evaluate and treat, OT evaluate and treat, SLP evaluate and treat    Diagnostics ordered/pending: TTE to assess cardiac function/status    Stroke workup:  -CTh:   1.  Left basal ganglia lacunar infarct of indeterminate age.  2.  Multiple old lacunar infarcts (right basal ganglia, right external capsule the right peritrigonal location), chronic microvascular ischemia and atrophy  -CTA h/n: No flow-limiting stenosis identified  -MRI brain: No acute intracranial findings identified  -ECHO: completed, interpretation pending  -CUS: negative  -LDL: 152  -A1c: 5.2  -TSH: 6.916  -not on antiplatelet, anticoagulation, or statin therapy at home    VTE prophylaxis: Enoxaparin 40 mg SQ every 24 hours  Mechanical prophylaxis: Place SCDs    BP parameters: normalize blood pressure

## 2023-03-02 NOTE — PLAN OF CARE
03/02/23 1323   Discharge Assessment   Assessment Type Discharge Planning Assessment   Confirmed/corrected address, phone number and insurance Yes   Confirmed Demographics Correct on Facesheet   Source of Information patient   People in Home spouse   Do you expect to return to your current living situation? Yes   Do you have help at home or someone to help you manage your care at home? Yes   Prior to hospitilization cognitive status: Alert/Oriented   Current cognitive status: Alert/Oriented   Walking or Climbing Stairs ambulation difficulty, requires equipment   Mobility Management Rollator   Home Accessibility stairs to enter home   Number of Stairs, Main Entrance four   Home Layout Able to live on 1st floor   Equipment Currently Used at Home walker, rolling   Readmission within 30 days? No   Patient currently being followed by outpatient case management? No   Do you currently have service(s) that help you manage your care at home? No   Do you take prescription medications? Yes   Do you have prescription coverage? Yes   Do you have any problems affording any of your prescribed medications? No   Is the patient taking medications as prescribed? yes   How do you get to doctors appointments? car, drives self   Are you on dialysis? No   Do you take coumadin? No   Discharge Plan A Home Health   Discharge Plan B Home Health   DME Needed Upon Discharge  none   Discharge Plan discussed with: Patient   Discharge Barriers Identified None     Patient lives with spouse in a single story home with 4 steps with railing upon entrance. Patient reports spouse has illnesses. Patient has daughters that lives nearby but have young children making it hard for them to assist patient. Patient was driving prior to admission however, concerns now regarding vision. DME in home: Rollator. PCP: Mabel De NP, Pharmacy: [facility]

## 2023-03-02 NOTE — SUBJECTIVE & OBJECTIVE
History reviewed. No pertinent past medical history.    No past surgical history on file.    Review of patient's allergies indicates:  No Known Allergies    Current Neurological Medications:     No current facility-administered medications on file prior to encounter.     Current Outpatient Medications on File Prior to Encounter   Medication Sig    amLODIPine (NORVASC) 10 MG tablet Take 10 mg by mouth once daily.    cyclobenzaprine (FLEXERIL) 5 MG tablet Take 10 mg by mouth nightly as needed for Muscle spasms.    esomeprazole (NEXIUM) 40 MG capsule Take 40 mg by mouth before breakfast.    losartan (COZAAR) 100 MG tablet Take 100 mg by mouth once daily.    mirabegron (MYRBETRIQ) 50 mg Tb24 Take by mouth.    vitamin D (VITAMIN D3) 1000 units Tab Take 2,000 Units by mouth once daily.     Family History    None       Tobacco Use    Smoking status: Unknown    Smokeless tobacco: Not on file   Substance and Sexual Activity    Alcohol use: Not Currently    Drug use: Not Currently    Sexual activity: Not on file     Review of Systems   Constitutional:  Positive for activity change.   Eyes:  Positive for visual disturbance (double vision).   Neurological:  Positive for dizziness, weakness and light-headedness.   All other systems reviewed and are negative.  Objective:     Vital Signs (Most Recent):  Temp: 98 °F (36.7 °C) (03/02/23 1132)  Pulse: 80 (03/02/23 1132)  Resp: 19 (03/02/23 1011)  BP: (!) 168/84 (03/02/23 1132)  SpO2: 99 % (03/02/23 1132)   Vital Signs (24h Range):  Temp:  [98 °F (36.7 °C)-98.5 °F (36.9 °C)] 98 °F (36.7 °C)  Pulse:  [80-94] 80  Resp:  [14-20] 19  SpO2:  [97 %-100 %] 99 %  BP: (143-187)/() 168/84     Weight: 83.9 kg (185 lb)  There is no height or weight on file to calculate BMI.    Physical Exam  HENT:      Head: Normocephalic.      Nose: Nose normal.   Eyes:      Comments: Third nerve palsy of left eye and right eye torsion, associated with double vision, light headedness, and nausea.    Pulmonary:      Effort: Pulmonary effort is normal.   Abdominal:      Palpations: Abdomen is soft.   Musculoskeletal:      Cervical back: Normal range of motion.   Skin:     General: Skin is warm and dry.      Capillary Refill: Capillary refill takes less than 2 seconds.   Neurological:      Mental Status: She is alert and oriented to person, place, and time.   Psychiatric:         Mood and Affect: Mood normal.         Speech: Speech normal.       NEUROLOGICAL EXAMINATION:     MENTAL STATUS   Oriented to person, place, and time.   Attention: normal. Concentration: normal.   Speech: speech is normal   Level of consciousness: alert    CRANIAL NERVES     CN III, IV, VI   CN III: left CN III palsy  Nystagmus type: torsional nystagmus of right eye.  Diplopia: vertical    CN V   Facial sensation intact.     CN VII   Facial expression full, symmetric.     CN IX, X   CN IX normal.   CN X normal.     CN XI   CN XI normal.     CN XII   CN XII normal.     MOTOR EXAM   Muscle bulk: normal  Overall muscle tone: normal    Strength   Right deltoid: 5/5  Left deltoid: 4/5  Right biceps: 5/5  Left biceps: 4/5  Right triceps: 5/5  Left triceps: 4/5  Right quadriceps: 2/5  Left quadriceps: 1/5  Right hamstrin/5  Left hamstrin/5  Right glutei: 2/5  Left glutei: 1/5    SENSORY EXAM   Light touch normal.     Significant Labs: CBC:   Recent Labs   Lab 23  0542   WBC 8.7 7.8   HGB 12.6 12.1   HCT 39.5 37.4    239     CMP:   Recent Labs   Lab 23  0542    142   K 3.6 4.0   CO2 22* 23   BUN 8.7* 8.0*   CREATININE 0.74 0.69   CALCIUM 9.8 9.6   MG  --  2.10   ALBUMIN 4.1  --    BILITOT 0.3  --    ALKPHOS 126  --    AST 19  --    ALT 19  --        Significant Imaging: I have reviewed all pertinent imaging results/findings within the past 24 hours.

## 2023-03-02 NOTE — PLAN OF CARE
Problem: Occupational Therapy  Goal: Occupational Therapy Goal  Description: Goals to be met by: 3/16/2023     Patient will increase functional independence with ADLs by performing:    UE Dressing with Modified [place].  LE Dressing with Modified [place].  Grooming while standing with Modified [place].  Toileting from bedside commode with Modified [place] for hygiene and clothing management.   Toilet transfer to toilet with Modified [place].  Increased functional strength to 5/5 for LUE.    Outcome: Ongoing, Progressing

## 2023-03-02 NOTE — NURSING
Nurses Note -- 4 Eyes      3/2/2023   2:11 PM      Skin assessed during: Admit      [x] No Pressure Injuries Present    []Prevention Measures Documented      [] Yes- Altered Skin Integrity Present or Discovered   [] LDA Added if Not in Epic (Describe Wound)   [] New Altered Skin Integrity was Present on Admit and Documented in LDA   [] Wound Image Taken    Wound Care Consulted? No    Attending Nurse:  Heike Garcia LPN     Second RN/Staff Member: ian Berntsein CNA

## 2023-03-03 LAB
AFP-TM SERPL-MCNC: <2 NG/ML
CANCER AG125 SERPL-ACNC: 7.1 UNIT/ML (ref 0–35)
CANCER AG19-9 SERPL-ACNC: <2.06 UNIT/ML (ref 0–37)
CEA SERPL-MCNC: <1.73 NG/ML (ref 0–3)
LEFT CCA DIST DIAS: 17 CM/S
LEFT CCA DIST SYS: 80 CM/S
LEFT CCA PROX DIAS: 16 CM/S
LEFT CCA PROX SYS: 96 CM/S
LEFT ECA DIAS: 9 CM/S
LEFT ECA SYS: 47 CM/S
LEFT ICA DIST DIAS: 17 CM/S
LEFT ICA DIST SYS: 53 CM/S
LEFT ICA MID DIAS: 15 CM/S
LEFT ICA MID SYS: 48 CM/S
LEFT ICA PROX DIAS: 12 CM/S
LEFT ICA PROX SYS: 38 CM/S
LEFT VERTEBRAL DIAS: 12 CM/S
LEFT VERTEBRAL SYS: 54 CM/S
OHS CV CAROTID RIGHT ICA EDV HIGHEST: 18
OHS CV CAROTID ULTRASOUND LEFT ICA/CCA RATIO: 0.66
OHS CV CAROTID ULTRASOUND RIGHT ICA/CCA RATIO: 0.8
OHS CV PV CAROTID LEFT HIGHEST CCA: 96
OHS CV PV CAROTID LEFT HIGHEST ICA: 53
OHS CV PV CAROTID RIGHT HIGHEST CCA: 69
OHS CV PV CAROTID RIGHT HIGHEST ICA: 55
OHS CV US CAROTID LEFT HIGHEST EDV: 17
RIGHT CCA DIST DIAS: 17 CM/S
RIGHT CCA DIST SYS: 69 CM/S
RIGHT CCA PROX DIAS: 11 CM/S
RIGHT CCA PROX SYS: 58 CM/S
RIGHT ECA DIAS: 8 CM/S
RIGHT ECA SYS: 36 CM/S
RIGHT ICA DIST DIAS: 16 CM/S
RIGHT ICA DIST SYS: 50 CM/S
RIGHT ICA MID DIAS: 18 CM/S
RIGHT ICA MID SYS: 55 CM/S
RIGHT ICA PROX DIAS: 10 CM/S
RIGHT ICA PROX SYS: 32 CM/S
RIGHT VERTEBRAL DIAS: 0 CM/S
RIGHT VERTEBRAL SYS: 39 CM/S

## 2023-03-03 PROCEDURE — 99233 SBSQ HOSP IP/OBS HIGH 50: CPT | Mod: ,,, | Performed by: PSYCHIATRY & NEUROLOGY

## 2023-03-03 PROCEDURE — 21400001 HC TELEMETRY ROOM

## 2023-03-03 PROCEDURE — 25000003 PHARM REV CODE 250: Performed by: NURSE PRACTITIONER

## 2023-03-03 PROCEDURE — 97162 PT EVAL MOD COMPLEX 30 MIN: CPT

## 2023-03-03 PROCEDURE — 99233 PR SUBSEQUENT HOSPITAL CARE,LEVL III: ICD-10-PCS | Mod: ,,, | Performed by: PSYCHIATRY & NEUROLOGY

## 2023-03-03 PROCEDURE — 63600175 PHARM REV CODE 636 W HCPCS: Performed by: INTERNAL MEDICINE

## 2023-03-03 PROCEDURE — 25500020 PHARM REV CODE 255: Performed by: INTERNAL MEDICINE

## 2023-03-03 PROCEDURE — 25000003 PHARM REV CODE 250: Performed by: INTERNAL MEDICINE

## 2023-03-03 PROCEDURE — 97535 SELF CARE MNGMENT TRAINING: CPT

## 2023-03-03 RX ORDER — ASPIRIN 81 MG/1
81 TABLET ORAL DAILY
Status: DISCONTINUED | OUTPATIENT
Start: 2023-03-03 | End: 2023-03-06 | Stop reason: HOSPADM

## 2023-03-03 RX ADMIN — Medication 81 MG: at 04:03

## 2023-03-03 RX ADMIN — ATORVASTATIN CALCIUM 40 MG: 40 TABLET, FILM COATED ORAL at 09:03

## 2023-03-03 RX ADMIN — ONDANSETRON 4 MG: 2 INJECTION INTRAMUSCULAR; INTRAVENOUS at 09:03

## 2023-03-03 RX ADMIN — ENOXAPARIN SODIUM 40 MG: 40 INJECTION SUBCUTANEOUS at 05:03

## 2023-03-03 RX ADMIN — PANTOPRAZOLE SODIUM 40 MG: 40 TABLET, DELAYED RELEASE ORAL at 09:03

## 2023-03-03 RX ADMIN — IOPAMIDOL 100 ML: 755 INJECTION, SOLUTION INTRAVENOUS at 09:03

## 2023-03-03 RX ADMIN — AMLODIPINE BESYLATE 10 MG: 5 TABLET ORAL at 09:03

## 2023-03-03 RX ADMIN — LOSARTAN POTASSIUM 100 MG: 50 TABLET, FILM COATED ORAL at 09:03

## 2023-03-03 NOTE — PLAN OF CARE
Problem: Physical Therapy  Goal: Physical Therapy Goal  Description: Goals to be met by: 4/3/2023     Patient will increase functional independence with mobility by performin. Supine to sit with [place]  2. Sit to stand transfer with Modified [place]  3. Gait  x 150 feet with Modified [place] using Rolling Walker.   4. Ascend/descend 4 stair with left Handrails Modified [place].     Outcome: Ongoing, Progressing

## 2023-03-03 NOTE — PT/OT/SLP PROGRESS
Occupational Therapy   Treatment    Name: Porsche Alston  MRN: [id number]  Admitting Diagnosis:  Oculomotor nerve palsy       Recommendations:     Discharge Recommendations: rehabilitation facility  Discharge Equipment Recommendations:  walker, rolling  Barriers to discharge:       Assessment:     Porsche Alston is a 72 y.o. female with a medical diagnosis of Oculomotor nerve palsy.  She presents with . Performance deficits affecting function are weakness, impaired endurance, impaired self care skills, impaired functional mobility, gait instability, impaired balance, visual deficits.     Rehab Prognosis:  Good; patient would benefit from acute skilled OT services to address these deficits and reach maximum level of function.       Plan:     Patient to be seen 5 x/week, 6 x/week to address the above listed problems via self-care/home management, therapeutic activities, therapeutic exercises  Plan of Care Expires:    Plan of Care Reviewed with: patient, family    Subjective     Pain/Comfort:       Objective:     Communicated with: nrsg prior to session.  Patient found up in chair with   upon OT entry to room.    General Precautions: Standard,      Orthopedic Precautions:   Braces:    Respiratory Status: Room air     Occupational Performance:         Functional Mobility/Transfers:  Patient completed Toilet Transfer Step Transfer technique with contact guard assistance with  rolling walker  Functional Mobility: no LOB; slow steady gait; assist to steer RW 2/2 vision ; taped glasses donned     Activities of Daily Living:  Toileting: minimum assistance with RW ; VC for safety with t/f   -Vision: taping performed to L lens with education and modification to address diplopia; improved vision with horizontal taping on L lens       Patient left up in chair with all lines intact, call button in reach, and family present    GOALS:   Multidisciplinary Problems       Occupational Therapy Goals          Problem: Occupational Therapy     Goal Priority Disciplines Outcome Interventions   Occupational Therapy Goal     OT, PT/OT Ongoing, Progressing    Description: Goals to be met by: 3/16/2023     Patient will increase functional independence with ADLs by performing:    UE Dressing with Modified [place].  LE Dressing with Modified [place].  Grooming while standing with Modified [place].  Toileting from bedside commode with Modified [place] for hygiene and clothing management.   Toilet transfer to toilet with Modified [place].  Increased functional strength to 5/5 for LUE.                         Time Tracking:     OT Date of Treatment:    OT Start Time: 1004  OT Stop Time: 1031  OT Total Time (min): 27 min    Billable Minutes:Self Care/Home Management 27min    OT/JORDON: OT     JORDON Visit Number: 1    3/3/2023

## 2023-03-03 NOTE — SUBJECTIVE & OBJECTIVE
Subjective:     Interval History: Sitting up in a chair, working with OT.  Family at bedside.  No new neurological issues overnight.      Current Facility-Administered Medications   Medication Dose Route Frequency Provider Last Rate Last Admin    acetaminophen tablet 650 mg  650 mg Oral Q6H PRN Mary BECKWITH MD Prashant        amLODIPine tablet 10 mg  10 mg Oral Daily [facility] AMENA MD Prashant   10 mg at 03/03/23 0943    atorvastatin tablet 40 mg  40 mg Oral Daily [facility] AMENA MD Prashant   40 mg at 03/03/23 0944    bisacodyL suppository 10 mg  10 mg Rectal Daily PRN [facility] AMENA MD Prashant        cyclobenzaprine tablet 10 mg  10 mg Oral Nightly PRN [facility] AMENA MD Prashant        enoxaparin injection 40 mg  40 mg Subcutaneous Daily Ali M MD Prashant   40 mg at 03/02/23 1800    labetaloL injection 10 mg  10 mg Intravenous Q30 Min PRN Ali AMENA MD Prashant        losartan tablet 100 mg  100 mg Oral Daily Ali AMENA MD Prashant   100 mg at 03/03/23 0943    ondansetron injection 4 mg  4 mg Intravenous Q4H PRN Ali AMENA MD Prashant   4 mg at 03/03/23 0950    pantoprazole EC tablet 40 mg  40 mg Oral Daily [facility] MD Prashant   40 mg at 03/03/23 0943    polyethylene glycol packet 17 g  17 g Oral BID PRN [facility] AMENA MD Prashant        prochlorperazine injection Soln 5 mg  5 mg Intravenous Q6H PRN [facility] AMENA MD Prashant        senna-docusate 8.6-50 mg per tablet 1 tablet  1 tablet Oral BID PRN [facility] AMENA MD Prashant        sodium chloride 0.9% flush 10 mL  10 mL Intravenous PRN [facility] AMENA MD Prashant           Review of Systems   Eyes:  Positive for visual disturbance.   Neurological:  Positive for weakness.   All other systems reviewed and are negative.    Objective:     Vital Signs (Most Recent):  Temp: 98.1 °F (36.7 °C) (03/03/23 1159)  Pulse: 85 (03/03/23 1159)  Resp: 18 (03/03/23 0356)  BP: (!) 147/85 (03/03/23 1159)  SpO2: 97 % (03/03/23 1159)   Vital Signs (24h Range):  Temp:  [98.1 °F (36.7 °C)-99 °F (37.2 °C)] 98.1 °F (36.7 °C)  Pulse:  [82-90] 85  Resp:  [16-18] 18  SpO2:  [95 %-97 %] 97 %  BP:  (127-147)/(75-85) 147/85     Weight: 83.9 kg (185 lb)  Body mass index is 31.76 kg/m².    Physical Exam  Constitutional:       General: She is not in acute distress.     Appearance: She is normal weight. She is not ill-appearing.   Eyes:      Comments: Left eye outward gaze   Cardiovascular:      Rate and Rhythm: Normal rate.   Pulmonary:      Effort: Pulmonary effort is normal.   Musculoskeletal:         General: Normal range of motion.   Skin:     General: Skin is warm and dry.   Neurological:      Mental Status: She is alert and oriented to person, place, and time.      Cranial Nerves: No dysarthria or facial asymmetry.      Sensory: Sensation is intact.      Motor: Weakness (RUE 5/5, RLE 2/5, LUE 4/5, LLE 1/5) present.   Psychiatric:         Mood and Affect: Mood normal.         Behavior: Behavior normal.       Significant Labs: BMP:   Recent Labs   Lab 03/01/23  1921 03/02/23  0542    142   K 3.6 4.0   CO2 22* 23   BUN 8.7* 8.0*   CREATININE 0.74 0.69   CALCIUM 9.8 9.6   MG  --  2.10     CBC:   Recent Labs   Lab 03/01/23  1921 03/02/23  0542   WBC 8.7 7.8   HGB 12.6 12.1   HCT 39.5 37.4    239       Significant Imaging: I have reviewed all pertinent imaging results/findings within the past 24 hours.    MRI C-spine and MRI L-spine reports reviewed with Dr Joyner.  CT chest showed Moderate pericardial effusion.

## 2023-03-03 NOTE — PROGRESS NOTES
"[facility] - 9th Floor Med Surg  Neurology  Progress Note    Patient Name: Porsche Alston  MRN: [id number]  Admission Date: 3/1/2023  Hospital Length of Stay: 2 days  Code Status: Full Code   Attending Provider: Rula Arceo MD  Primary Care Physician: Mabel De NP   Principal Problem:Oculomotor nerve palsy    HPI:   72-year-old female with past medical history of HTN, tobacco use, presented to ED on 3/1 with reports of diplopia and generalized weakness.  She also reports "dragging left side" for quite some time and started following back surgery. CTh showed left basal ganglia lacunar infarct of indeterminate age.  She was admitted to Hospitalist service and neurology was consulted for stroke workup.      Overview/Hospital Course:  No notes on file        Subjective:     Interval History: Sitting up in a chair, working with OT.  Family at bedside.  No new neurological issues overnight.      Current Facility-Administered Medications   Medication Dose Route Frequency Provider Last Rate Last Admin    acetaminophen tablet 650 mg  650 mg Oral Q6H PRN Ali M MD Prashant        amLODIPine tablet 10 mg  10 mg Oral Daily [facility] MD Prashant   10 mg at 03/03/23 0943    atorvastatin tablet 40 mg  40 mg Oral Daily [facility] MD Prashant   40 mg at 03/03/23 0944    bisacodyL suppository 10 mg  10 mg Rectal Daily PRN [facility] MD Prashant        cyclobenzaprine tablet 10 mg  10 mg Oral Nightly PRN [facility] MD Prashant        enoxaparin injection 40 mg  40 mg Subcutaneous Daily Ali M MD Prashant   40 mg at 03/02/23 1800    labetaloL injection 10 mg  10 mg Intravenous Q30 Min PRN Ali M MD Prashant        losartan tablet 100 mg  100 mg Oral Daily [facility] MD Prashant   100 mg at 03/03/23 0943    ondansetron injection 4 mg  4 mg Intravenous Q4H PRN [facility] MD Prashant   4 mg at 03/03/23 0950    pantoprazole EC tablet 40 mg  40 mg Oral Daily [facility] MD Prashant   40 mg at 03/03/23 0943    polyethylene glycol packet 17 g  17 g Oral BID PRN Ali M " MD Prashant        prochlorperazine injection Soln 5 mg  5 mg Intravenous Q6H PRN Mary Cerda MD        senna-docusate 8.6-50 mg per tablet 1 tablet  1 tablet Oral BID PRN Mary Cerda MD        sodium chloride 0.9% flush 10 mL  10 mL Intravenous PRN Mary Cerda MD           Review of Systems   Eyes:  Positive for visual disturbance.   Neurological:  Positive for weakness.   All other systems reviewed and are negative.    Objective:     Vital Signs (Most Recent):  Temp: 98.1 °F (36.7 °C) (03/03/23 1159)  Pulse: 85 (03/03/23 1159)  Resp: 18 (03/03/23 0356)  BP: (!) 147/85 (03/03/23 1159)  SpO2: 97 % (03/03/23 1159)   Vital Signs (24h Range):  Temp:  [98.1 °F (36.7 °C)-99 °F (37.2 °C)] 98.1 °F (36.7 °C)  Pulse:  [82-90] 85  Resp:  [16-18] 18  SpO2:  [95 %-97 %] 97 %  BP: (127-147)/(75-85) 147/85     Weight: 83.9 kg (185 lb)  Body mass index is 31.76 kg/m².    Physical Exam  Constitutional:       General: She is not in acute distress.     Appearance: She is normal weight. She is not ill-appearing.   Eyes:      Comments: Left eye outward gaze   Cardiovascular:      Rate and Rhythm: Normal rate.   Pulmonary:      Effort: Pulmonary effort is normal.   Musculoskeletal:         General: Normal range of motion.   Skin:     General: Skin is warm and dry.   Neurological:      Mental Status: She is alert and oriented to person, place, and time.      Cranial Nerves: No dysarthria or facial asymmetry.      Sensory: Sensation is intact.      Motor: Weakness (RUE 5/5, RLE 2/5, LUE 4/5, LLE 1/5) present.   Psychiatric:         Mood and Affect: Mood normal.         Behavior: Behavior normal.       Significant Labs: BMP:   Recent Labs   Lab 03/01/23  1921 03/02/23  0542    142   K 3.6 4.0   CO2 22* 23   BUN 8.7* 8.0*   CREATININE 0.74 0.69   CALCIUM 9.8 9.6   MG  --  2.10     CBC:   Recent Labs   Lab 03/01/23  1921 03/02/23  0542   WBC 8.7 7.8   HGB 12.6 12.1   HCT 39.5 37.4    239       Significant Imaging: I have  reviewed all pertinent imaging results/findings within the past 24 hours.    MRI C-spine and MRI L-spine reports reviewed with Dr Joyner.  CT chest showed Moderate pericardial effusion.       Assessment and Plan:     * Oculomotor nerve palsy  Diplopia - r/o stroke    -MRI brain negative for acute infarct ... does have old strokes present on imaging.    MRI brain w/ contrast ordered ... will have to wait until tonight since she had imaging with contrast  Will also order MRI orbit   Continue ASA 81mg daily  Continue Atorvastatin 40mg daily  PT/OT/ST   CT chest showed pericardial effusion .... will defer management to hospitalist      Antithrombotics for secondary stroke prevention: Antiplatelets: Aspirin: 81 mg daily    Statins for secondary stroke prevention and hyperlipidemia, if present: Statins: Atorvastatin- 40 mg daily    Aggressive risk factor modification: HTN, Smoking, HLD     Rehab efforts: The patient has been evaluated by a stroke team provider and the therapy needs have been fully considered based off the presenting complaints and exam findings. The following therapy evaluations are needed: PT evaluate and treat, OT evaluate and treat, SLP evaluate and treat    Diagnostics ordered/pending: None    Stroke workup:  -CTh:   1.  Left basal ganglia lacunar infarct of indeterminate age.  2.  Multiple old lacunar infarcts (right basal ganglia, right external capsule the right peritrigonal location), chronic microvascular ischemia and atrophy  -CTA h/n: No flow-limiting stenosis identified  -MRI brain: No acute intracranial findings identified  -ECHO: EF 65%, bubble study negative  -CUS: negative  -LDL: 152  -A1c: 5.2  -TSH: 6.916  -not on antiplatelet, anticoagulation, or statin therapy at home    VTE prophylaxis: Enoxaparin 40 mg SQ every 24 hours  Mechanical prophylaxis: Place SCDs    BP parameters: normalize blood pressure            VTE Risk Mitigation (From admission, onward)         Ordered     enoxaparin  injection 40 mg  Daily         03/02/23 0120     Place sequential compression device  Until discontinued         03/02/23 0117                SADIE Marti  Neurology  [facility] - 9th Floor Med Surg

## 2023-03-03 NOTE — PROGRESS NOTES
[facility]  Hospital Medicine Progress Note        Chief Complaint: Inpatient Follow-up    HPI:   72-year-old female with significant history of HTN, noncompliant with medications, chronic heavy tobacco use presented to the hospital with complaints of diplopia, generalized weakness .  patient reported that she is been dragging her left side for the past 1 week.  Patient was hypertensive in the ED . otherwise hemodynamically stable.  Chest x-ray unremarkable .  CT head was concerning for left basal ganglia lacunar infarct and multiple old infarcts.  CT angiogram with no large vessel occlusion . labs unremarkable . patient was admitted hospitalist medicine service for stroke workup.  Neurology consulted.  MRI brain negative for acute changes, has multiple old infarcts.  Ultrasound carotid negative.  Echocardiogram pending.  Neurology evaluated.  Concern for neuro inflammatory/neoplastic process given oculomotor nerve palsy.  Patient was complaining of left-sided upper and lower extremity weakness and therefore MRI spine also ordered.  Neurology ordered LP/CSF study.    Interval Hx:   Patient seen at bedside.  Comfortably sitting in the couch.  Still having diplopia .  no other new complaints.  Hemodynamics are stable.  No acute events overnight.    Objective/physical exam:  General: In no acute distress, afebrile  Chest: Clear to auscultation bilaterally  Heart: S1, S2, no appreciable murmur  Abdomen: Soft, nontender, BS +  MSK: Warm, no lower extremity edema, no clubbing or cyanosis  Neurologic: Alert and oriented x4, mild left-sided weakness  VITAL SIGNS: 24 HRS MIN & MAX LAST   Temp  Min: 98 °F (36.7 °C)  Max: 99 °F (37.2 °C) 98.8 °F (37.1 °C)   BP  Min: 127/77  Max: 168/84 128/84   Pulse  Min: 80  Max: 94  82   Resp  Min: 14  Max: 20 18   SpO2  Min: 95 %  Max: 100 % 95 %       Recent Labs   Lab 03/02/23  0542   WBC 7.8   RBC 4.09*   HGB 12.1   HCT 37.4   MCV 91.4   MCH 29.6   MCHC  32.4*   RDW 13.2      MPV 11.3*         Recent Labs   Lab 03/01/23  1921 03/02/23  0542    142   K 3.6 4.0   CO2 22* 23   BUN 8.7* 8.0*   CREATININE 0.74 0.69   CALCIUM 9.8 9.6   MG  --  2.10   ALBUMIN 4.1  --    ALKPHOS 126  --    ALT 19  --    AST 19  --    BILITOT 0.3  --           Microbiology Results (last 7 days)       Procedure Component Value Units Date/Time    Cerebrospinal Fluid Culture [[id number]] Collected: 03/02/23 1607    Order Status: Completed Specimen: CSF (Spinal Fluid) from Cerebrospinal Fluid Updated: 03/03/23 0624     CULTURE, CSF ([facility]) No Growth At 24 Hours    Carol Ink Prep CSF [[id number]] Collected: 03/02/23 1607    Order Status: Completed Specimen: CSF (Spinal Fluid) from Cerebrospinal Fluid Updated: 03/02/23 1821     CAROL INK PREP CSF ([facility]) Negative    Gram Stain [[id number]] Collected: 03/02/23 1607    Order Status: Completed Specimen: CSF (Spinal Fluid) from Lumbar Updated: 03/02/23 1820     GRAM STAIN No WBCs, No bacteria seen    Fungal Culture [[id number]] Collected: 03/02/23 1607    Order Status: Sent Specimen: CSF (Spinal Fluid) from Lumbar Updated: 03/02/23 1644    Cryptococcal antigen, CSF [[id number]] Collected: 03/02/23 1607    Order Status: Canceled Specimen: CSF (Spinal Fluid) from CSF Tap, Tube 1 Updated: 03/02/23 1643             Scheduled Med:   amLODIPine  10 mg Oral Daily    atorvastatin  40 mg Oral Daily    enoxaparin  40 mg Subcutaneous Daily    losartan  100 mg Oral Daily    pantoprazole  40 mg Oral Daily          Assessment/Plan:    Acute onset diplopia  Left oculomotor nerve palsy   Left-sided weakness   Old CVA  Poorly-controlled HTN-better   Chronic heavy tobacco abuse   Small pericardial effusion  Prophylaxis    MRI brain without contrast-no acute changes   Confirmed old CVA   Neurology has concerns for neuro inflammatory/neoplastic process  LP with CSF evaluation ordered   Cell count unremarkable   Await cytology   Neurology also additionally ordered MRI  brain with and without contrast  Also ordered MRI C-spine, MRI L-spine given left-sided weakness   Non impressive except for degenerative disc disease   Since MRI confirmed old CVA patient initiated on aspirin, statin  BP now controlled on amlodipine, losartan  Also ordered CT chest given her heavy tobacco use history-no concern for malignancy  I will order tumor markers-CEA, , CA 19 9, if elevated will consider CT abdomen/pelvis/further evaluation  Continue therapy services   CT chest with concerns for moderate effusion, but as per echocardiogram there is only a small pericardial effusion   Patient is hemodynamically stable   Continue PPI  DVT prophylaxis-Lovenox        Rula Arceo MD   03/03/2023

## 2023-03-03 NOTE — PT/OT/SLP EVAL
"Physical Therapy Evaluation    Patient Name:  Porsche Alston   MRN:  [id number]    Recommendations:     Discharge Recommendations: rehabilitation facility, nursing facility, skilled (pending progress)   Discharge Equipment Recommendations: walker, rolling   Barriers to discharge:  impaired mobility    Assessment:     Porsche Alston is a 72 y.o. female admitted with a medical diagnosis of Oculomotor nerve palsy with subacute basal ganglia infarct.  She presents with the following impairments/functional limitations: weakness, impaired endurance, impaired sensation, impaired self care skills, visual deficits. Patient tolerated PT evaluation well. Patient performed entire evaluation with L eye closed to prevent symptoms of double vision. Patient transferred supine<>sit independently. Patient demonstrated good BLE strength, decreased strength into L PF and DF with DF strength greater than PF. Patient transitioned sit<>stand with RW and Jaspal, equal Wbing on BLEs. Patient ambulated 25 with multiple standing rest breaks to "reorient" herself and 12 additional feet after seated rest break due to decreased endurance. Patient ambulated with RW and CGA, slow jarocho and shortened step length B. Patient transitioned to chair with step transfer, RW and CGA. Patient is appropriate for skilled acute PT to increase independence with functional mobility. PT recommending SNF vs IP rehab upon discharge.     Rehab Prognosis: Good; patient would benefit from acute skilled PT services to address these deficits and reach maximum level of function.    Recent Surgery: * No surgery found *      Plan:     During this hospitalization, patient to be seen 6 x/week to address the identified rehab impairments via gait training, therapeutic activities, therapeutic exercises, neuromuscular re-education and progress toward the following goals:    Plan of Care Expires:  04/03/23    Subjective     Chief Complaint: instability with ambulation  Patient/Family " "Comments/goals: to return to PLOF  Pain/Comfort:  Pain Rating 1: 0/10    Patients cultural, spiritual, Restorationism conflicts given the current situation: no    Living Environment:  Patient lives with  and granddaughter in single story home with 4 steps to enter and L hand rail present on ascent.  Prior to admission, patients level of function was Charlie.  Equipment used at home: other (see comments) (Rollator).  DME owned (not currently used): none.  Upon discharge, patient will have assistance from self and .    Objective:     Communicated with RN prior to session.  Patient found up in chair with peripheral IV, pulse ox (continuous), telemetry  upon PT entry to room.    General Precautions: Standard, fall  Orthopedic Precautions:N/A   Braces: N/A  Respiratory Status: Room air    Exams:  Cognitive Exam:  Patient is oriented to Person, Place, Time, and Situation  Sensation: -       Impaired  light/touch on L foot, diminished sensation  RLE ROM: WNL  RLE Strength: WNL  LLE ROM: WNL  LLE Strength: WFL except PF and DF 3/5    Functional Mobility:  Bed Mobility:  Supine to Sit: independence  Transfers:  Sit to Stand:  minimum assistance with rolling walker  Bed to Chair: contact guard assistance with  rolling walker  using  Step Transfer  Gait: 25ft and 12 ft with seated rest break in between trials, multiple standing rest breaks during ambulation to "reorient" herself d/t L eye closed to prevent double vision      AM-PAC 6 CLICK MOBILITY  Total Score:17     Patient left up in chair with all lines intact and call button in reach.    GOALS:   Multidisciplinary Problems       Physical Therapy Goals          Problem: Physical Therapy    Goal Priority Disciplines Outcome Goal Variances Interventions   Physical Therapy Goal    High PT, PT/OT Ongoing, Progressing     Description: Goals to be met by: 4/3/2023     Patient will increase functional independence with mobility by performin. Supine to sit with " [place]  2. Sit to stand transfer with Modified [place]  3. Gait  x 150 feet with Modified [place] using Rolling Walker.   4. Ascend/descend 4 stair with left Handrails Modified [place].                          History:     History reviewed. No pertinent past medical history.    No past surgical history on file.    Time Tracking:     PT Received On: 03/03/23  PT Start Time: 0826     PT Stop Time: 0844  PT Total Time (min): 18 min     Billable Minutes: Evaluation moderate complexity      03/03/2023

## 2023-03-03 NOTE — ASSESSMENT & PLAN NOTE
Diplopia - r/o stroke    -MRI brain negative for acute infarct ... does have old strokes present on imaging.    MRI brain w/ contrast ordered ... will have to wait until tonight since she had imaging with contrast  Will also order MRI orbit   Continue ASA 81mg daily  Continue Atorvastatin 40mg daily  PT/OT/ST   CT chest showed pericardial effusion .... will defer management to hospitalist      Antithrombotics for secondary stroke prevention: Antiplatelets: Aspirin: 81 mg daily    Statins for secondary stroke prevention and hyperlipidemia, if present: Statins: Atorvastatin- 40 mg daily    Aggressive risk factor modification: HTN, Smoking, HLD     Rehab efforts: The patient has been evaluated by a stroke team provider and the therapy needs have been fully considered based off the presenting complaints and exam findings. The following therapy evaluations are needed: PT evaluate and treat, OT evaluate and treat, SLP evaluate and treat    Diagnostics ordered/pending: None    Stroke workup:  -CTh:   1.  Left basal ganglia lacunar infarct of indeterminate age.  2.  Multiple old lacunar infarcts (right basal ganglia, right external capsule the right peritrigonal location), chronic microvascular ischemia and atrophy  -CTA h/n: No flow-limiting stenosis identified  -MRI brain: No acute intracranial findings identified  -ECHO: EF 65%, bubble study negative  -CUS: negative  -LDL: 152  -A1c: 5.2  -TSH: 6.916  -not on antiplatelet, anticoagulation, or statin therapy at home    VTE prophylaxis: Enoxaparin 40 mg SQ every 24 hours  Mechanical prophylaxis: Place SCDs    BP parameters: normalize blood pressure

## 2023-03-04 PROCEDURE — 94761 N-INVAS EAR/PLS OXIMETRY MLT: CPT

## 2023-03-04 PROCEDURE — 25000003 PHARM REV CODE 250: Performed by: INTERNAL MEDICINE

## 2023-03-04 PROCEDURE — A9577 INJ MULTIHANCE: HCPCS | Performed by: INTERNAL MEDICINE

## 2023-03-04 PROCEDURE — 25500020 PHARM REV CODE 255: Performed by: INTERNAL MEDICINE

## 2023-03-04 PROCEDURE — 63600175 PHARM REV CODE 636 W HCPCS: Performed by: INTERNAL MEDICINE

## 2023-03-04 PROCEDURE — 25000003 PHARM REV CODE 250: Performed by: NURSE PRACTITIONER

## 2023-03-04 PROCEDURE — 99232 SBSQ HOSP IP/OBS MODERATE 35: CPT | Mod: ,,, | Performed by: PSYCHIATRY & NEUROLOGY

## 2023-03-04 PROCEDURE — 21400001 HC TELEMETRY ROOM

## 2023-03-04 PROCEDURE — 99232 PR SUBSEQUENT HOSPITAL CARE,LEVL II: ICD-10-PCS | Mod: ,,, | Performed by: PSYCHIATRY & NEUROLOGY

## 2023-03-04 RX ADMIN — ATORVASTATIN CALCIUM 40 MG: 40 TABLET, FILM COATED ORAL at 08:03

## 2023-03-04 RX ADMIN — ENOXAPARIN SODIUM 40 MG: 40 INJECTION SUBCUTANEOUS at 05:03

## 2023-03-04 RX ADMIN — ACETAMINOPHEN 325MG 650 MG: 325 TABLET ORAL at 08:03

## 2023-03-04 RX ADMIN — ONDANSETRON 4 MG: 2 INJECTION INTRAMUSCULAR; INTRAVENOUS at 08:03

## 2023-03-04 RX ADMIN — Medication 81 MG: at 08:03

## 2023-03-04 RX ADMIN — LOSARTAN POTASSIUM 100 MG: 50 TABLET, FILM COATED ORAL at 08:03

## 2023-03-04 RX ADMIN — PANTOPRAZOLE SODIUM 40 MG: 40 TABLET, DELAYED RELEASE ORAL at 08:03

## 2023-03-04 RX ADMIN — AMLODIPINE BESYLATE 10 MG: 5 TABLET ORAL at 08:03

## 2023-03-04 RX ADMIN — GADOBENATE DIMEGLUMINE 17 ML: 529 INJECTION, SOLUTION INTRAVENOUS at 01:03

## 2023-03-04 NOTE — SUBJECTIVE & OBJECTIVE
Subjective:     Interval History:   Remains with left eye palsy and diplopia, no new issues, awaiting MRI results, family at bedside.     Current Neurological Medications:     Current Facility-Administered Medications   Medication Dose Route Frequency Provider Last Rate Last Admin    acetaminophen tablet 650 mg  650 mg Oral Q6H PRN Mary BECKWITH MD Prashant        amLODIPine tablet 10 mg  10 mg Oral Daily Mary BECKWITH MD Prashant   10 mg at 03/04/23 0850    aspirin EC tablet 81 mg  81 mg Oral Daily ROSEMARY MartiP   81 mg at 03/04/23 0850    atorvastatin tablet 40 mg  40 mg Oral Daily Mary BECKWITH MD Prashant   40 mg at 03/04/23 0850    bisacodyL suppository 10 mg  10 mg Rectal Daily PRN Mary BECKWITH MD Prashant        cyclobenzaprine tablet 10 mg  10 mg Oral Nightly PRN Mary BECKWITH MD Prashant        enoxaparin injection 40 mg  40 mg Subcutaneous Daily Ali AMENA MD Prashant   40 mg at 03/03/23 1748    labetaloL injection 10 mg  10 mg Intravenous Q30 Min PRN Mary BECKWITH MD Prashant        losartan tablet 100 mg  100 mg Oral Daily Mary BECKWITH MD Prashant   100 mg at 03/04/23 0850    ondansetron injection 4 mg  4 mg Intravenous Q4H PRN Mary BECKWITH MD Prashant   4 mg at 03/03/23 0950    pantoprazole EC tablet 40 mg  40 mg Oral Daily Mary BECKWITH MD Prashant   40 mg at 03/04/23 0850    polyethylene glycol packet 17 g  17 g Oral BID PRN Mary BECKWITH MD Prashant        prochlorperazine injection Soln 5 mg  5 mg Intravenous Q6H PRN Mary AMENA MD Prashant        senna-docusate 8.6-50 mg per tablet 1 tablet  1 tablet Oral BID PRN Mary BECKWITH MD Prashant        sodium chloride 0.9% flush 10 mL  10 mL Intravenous PRN Mary AMENA MD Prashant           Review of Systems   All other systems reviewed and are negative.  Objective:     Vital Signs (Most Recent):  Temp: 98.2 °F (36.8 °C) (03/04/23 0802)  Pulse: 86 (03/04/23 0852)  Resp: 18 (03/04/23 0351)  BP: 130/81 (03/04/23 0852)  SpO2: 96 % (03/04/23 0802) Vital Signs (24h Range):  Temp:  [98 °F (36.7 °C)-98.7 °F (37.1 °C)] 98.2 °F (36.8 °C)  Pulse:  [82-95] 86  Resp:  [16-18]  18  SpO2:  [93 %-98 %] 96 %  BP: (110-148)/() 130/81     Weight: 83.9 kg (185 lb)  Body mass index is 31.76 kg/m².    Physical Exam  Vitals reviewed.   Constitutional:       General: She is not in acute distress.     Appearance: She is not ill-appearing, toxic-appearing or diaphoretic.   HENT:      Head: Normocephalic and atraumatic.      Right Ear: Hearing normal.      Left Ear: Hearing normal.   Eyes:      General: Visual field deficit present.      Extraocular Movements:      Right eye: No nystagmus.      Left eye: Abnormal extraocular motion present.      Pupils: Pupils are equal, round, and reactive to light.   Pulmonary:      Effort: Pulmonary effort is normal.   Musculoskeletal:      Cervical back: Normal range of motion.      Right lower leg: No edema.      Left lower leg: No edema.   Skin:     General: Skin is warm and dry.      Capillary Refill: Capillary refill takes less than 2 seconds.   Neurological:      Mental Status: She is alert.      Cranial Nerves: No dysarthria or facial asymmetry.      Sensory: No sensory deficit.      Motor: No weakness, tremor, atrophy, abnormal muscle tone, seizure activity or pronator drift.   Psychiatric:         Attention and Perception: Attention normal.         Speech: Speech normal.         Behavior: Behavior is cooperative.       NEUROLOGICAL EXAMINATION:     MENTAL STATUS   Speech: speech is normal     CRANIAL NERVES     CN III, IV, VI   Pupils are equal, round, and reactive to light.    Significant Labs:   Recent Lab Results       None            Significant Imaging: I have reviewed all pertinent imaging results/findings within the past 24 hours.

## 2023-03-04 NOTE — ASSESSMENT & PLAN NOTE
Diplopia - r/o stroke    -MRI brain negative for acute infarct ... does have old strokes present on imaging.    Awaiting interpretation of MRI brain w/ contrast, MRI orbits w/ w/o   Continue ASA 81mg daily  Continue Atorvastatin 40mg daily  PT/OT/ST       Antithrombotics for secondary stroke prevention: Antiplatelets: Aspirin: 81 mg daily    Statins for secondary stroke prevention and hyperlipidemia, if present: Statins: Atorvastatin- 40 mg daily    Aggressive risk factor modification: HTN, Smoking, HLD     Rehab efforts: The patient has been evaluated by a stroke team provider and the therapy needs have been fully considered based off the presenting complaints and exam findings. The following therapy evaluations are needed: PT evaluate and treat, OT evaluate and treat, SLP evaluate and treat    Diagnostics ordered/pending: None    Stroke workup:  -CTh:   1.  Left basal ganglia lacunar infarct of indeterminate age.  2.  Multiple old lacunar infarcts (right basal ganglia, right external capsule the right peritrigonal location), chronic microvascular ischemia and atrophy  -CTA h/n: No flow-limiting stenosis identified  -MRI brain: No acute intracranial findings identified  -ECHO: EF 65%, bubble study negative  -CUS: negative  -LDL: 152  -A1c: 5.2  -TSH: 6.916  -not on antiplatelet, anticoagulation, or statin therapy at home    VTE prophylaxis: Enoxaparin 40 mg SQ every 24 hours  Mechanical prophylaxis: Place SCDs    BP parameters: normalize blood pressure

## 2023-03-04 NOTE — PLAN OF CARE
Problem: Adult Inpatient Plan of Care  Goal: Plan of Care Review  Outcome: Ongoing, Progressing  Goal: Patient-Specific Goal (Individualized)  Outcome: Ongoing, Progressing  Goal: Absence of Hospital-Acquired Illness or Injury  Outcome: Ongoing, Progressing  Goal: Optimal Comfort and Wellbeing  Outcome: Ongoing, Progressing  Goal: Readiness for Transition of Care  Outcome: Ongoing, Progressing     Problem: Infection  Goal: Absence of Infection Signs and Symptoms  Outcome: Ongoing, Progressing     Problem: Adjustment to Illness (Stroke, Ischemic/Transient Ischemic Attack)  Goal: Optimal Coping  Outcome: Ongoing, Progressing     Problem: Bowel Elimination Impaired (Stroke, Ischemic/Transient Ischemic Attack)  Goal: Effective Bowel Elimination  Outcome: Ongoing, Progressing     Problem: Cerebral Tissue Perfusion (Stroke, Ischemic/Transient Ischemic Attack)  Goal: Optimal Cerebral Tissue Perfusion  Outcome: Ongoing, Progressing     Problem: Cognitive Impairment (Stroke, Ischemic/Transient Ischemic Attack)  Goal: Optimal Cognitive Function  Outcome: Ongoing, Progressing     Problem: Communication Impairment (Stroke, Ischemic/Transient Ischemic Attack)  Goal: Improved Communication Skills  Outcome: Ongoing, Progressing     Problem: Functional Ability Impaired (Stroke, Ischemic/Transient Ischemic Attack)  Goal: Optimal Functional Ability  Outcome: Ongoing, Progressing     Problem: Respiratory Compromise (Stroke, Ischemic/Transient Ischemic Attack)  Goal: Effective Oxygenation and Ventilation  Outcome: Ongoing, Progressing     Problem: Sensorimotor Impairment (Stroke, Ischemic/Transient Ischemic Attack)  Goal: Improved Sensorimotor Function  Outcome: Ongoing, Progressing     Problem: Swallowing Impairment (Stroke, Ischemic/Transient Ischemic Attack)  Goal: Optimal Eating and Swallowing without Aspiration  Outcome: Ongoing, Progressing     Problem: Urinary Elimination Impaired (Stroke, Ischemic/Transient Ischemic  Attack)  Goal: Effective Urinary Elimination  Outcome: Ongoing, Progressing     Problem: Skin Injury Risk Increased  Goal: Skin Health and Integrity  Outcome: Ongoing, Progressing

## 2023-03-04 NOTE — PROGRESS NOTES
[facility]  Hospital Medicine Progress Note        Chief Complaint: Inpatient follow-up on diplopia    HPI:   This is a 72-year-old  female with medical history of hypertension, noncompliant with medications and Tobacco abuse, more than 45 pack years who presented to the ED with complaints of diplopia that started today, generalized weakness and dragging her left side for the past week.     On arrival to ED she was afebrile, hypertensive, saturating 98% on room air.  EKG showed normal sinus rhythm.  Chest x-ray was unremarkable.  CT head showed left basal ganglia lacunar infarct of indeterminate age as well as multiple old lacunar infarcts involving the right basal ganglia, right external capsule and right peritrigonal location.  CTA head and neck showed no large vessel occlusion.  Labs unremarkable, A1c 5.2, lipid panel pending.     Patient was very promptly admitted to the hospital medicine service.    Bilateral carotid Doppler ultrasound was unrevealing.  Lumbar puncture was unrevealing.    CT of the thorax revealed a small pericardial effusion otherwise unremarkable.    MRI of the lumbar spine revealed significant spinal stenosis  MRI of the cervical spine revealed degenerative changes otherwise unremarkable.  MRI of the brain revealed no acute findings.    MRI of the orbits revealed findings suggestive of chronic right optic neuropathy.    Interval Hx:   Patient is sitting up on the edge of the bed with no new complaints.  She reports that her left eye ophthalmoplegia is improving.  Couple of family members are present visiting.  Patient is afebrile, on room air, hemodynamically stable.    Objective/physical exam:  General: in no acute distress  HENT: normocephalic, atraumatic  Eye:  Left eye ophthalmoplegia with deviation to the left  Neck: full ROM, no thyromegaly, no JVD  Respiratory: clear to auscultation bilaterally  Cardiovascular: regular rate and  rhythm  Gastrointestinal: non-distended, positive bowel sounds, non-tender  Musculoskeletal: no gross deformity  Integumentary: warm, dry, intact, no rashes  Neurological: cranial nerves grossly intact, bilateral lower extremity paresis worse on the left  Psychiatric: cooperative      VITAL SIGNS: 24 HRS MIN & MAX LAST   Temp  Min: 97.9 °F (36.6 °C)  Max: 98.7 °F (37.1 °C) 97.9 °F (36.6 °C)   BP  Min: 110/78  Max: 148/100 (!) 145/89     Pulse  Min: 82  Max: 95  95   Resp  Min: 16  Max: 18 18   SpO2  Min: 93 %  Max: 98 % 98 %       Recent Labs   Lab 03/01/23 1921 03/02/23  0542   WBC 8.7 7.8   RBC 4.33 4.09*   HGB 12.6 12.1   HCT 39.5 37.4   MCV 91.2 91.4   MCH 29.1 29.6   MCHC 31.9* 32.4*   RDW 12.9 13.2    239   MPV 10.9* 11.3*       Recent Labs   Lab 03/01/23 1921 03/02/23  0542    142   K 3.6 4.0   CO2 22* 23   BUN 8.7* 8.0*   CREATININE 0.74 0.69   CALCIUM 9.8 9.6   MG  --  2.10   ALBUMIN 4.1  --    ALKPHOS 126  --    ALT 19  --    AST 19  --    BILITOT 0.3  --           Microbiology Results (last 7 days)       Procedure Component Value Units Date/Time    Cerebrospinal Fluid Culture [[id number]] Collected: 03/02/23 1607    Order Status: Completed Specimen: CSF (Spinal Fluid) from Cerebrospinal Fluid Updated: 03/04/23 0929     CULTURE, CSF ([facility]) No Growth At 48 Hours    Carol Ink Prep CSF [[id number]] Collected: 03/02/23 1607    Order Status: Completed Specimen: CSF (Spinal Fluid) from Cerebrospinal Fluid Updated: 03/02/23 1821     CAROL INK PREP CSF ([facility]) Negative    Gram Stain [[id number]] Collected: 03/02/23 1607    Order Status: Completed Specimen: CSF (Spinal Fluid) from Lumbar Updated: 03/02/23 1820     GRAM STAIN No WBCs, No bacteria seen    Fungal Culture [[id number]] Collected: 03/02/23 1607    Order Status: Sent Specimen: CSF (Spinal Fluid) from Lumbar Updated: 03/02/23 1644    Cryptococcal antigen, CSF [[id number]] Collected: 03/02/23 1607    Order Status: Canceled Specimen: CSF (Spinal  Fluid) from CSF Tap, Tube 1 Updated: 03/02/23 7617             See below for Radiology    Scheduled Med:   amLODIPine  10 mg Oral Daily    aspirin  81 mg Oral Daily    atorvastatin  40 mg Oral Daily    enoxaparin  40 mg Subcutaneous Daily    losartan  100 mg Oral Daily    pantoprazole  40 mg Oral Daily        Continuous Infusions:  None.       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 etiology  Lumbar spinal stenosis  Multiple old lacunar infarctions  Lower extremity paresis likely due to spinal stenosis   Essential hypertension, suboptimally controlled   Small pericardial effusion   Tobacco abuse      Plan:   Neurology evaluated the patient and are awaiting the results of the MRI of the brain and MRI of the orbits  Follow-up on an outpatient basis with Neurosurgery for spinal stenosis  Continue current medications, physical therapy, occupational therapy, and other supportive care in the meantime    VTE prophylaxis:     Patient condition:  Stable    Anticipated discharge and Disposition:     Discharge when cleared by Neurology    All diagnosis and differential diagnosis have been reviewed; assessment and plan has been documented; I have personally reviewed the labs and test results that are presently available; I have reviewed the patients medication list; I have reviewed the consulting providers response and recommendations. I have reviewed or attempted to review medical records based upon their availability    All of the patient's questions have been  addressed and answered. Patient's is agreeable to the above stated plan. I will continue to monitor closely and make adjustments to medical management as  needed.  _____________________________________________________________________      Radiology:  MRI Orbits W W/O Contrast  Narrative: EXAMINATION:  MRI ORBITS W W/O CONTRAST    CLINICAL HISTORY:  Ophthalmoplegia;    TECHNIQUE:  Multiplanar, multisequence MR images of the orbits were obtained with and without administration of intravenous contrast.    COMPARISON:  MRI brain from the same day    FINDINGS:  Evaluation is limited by motion artifact.  The globes are normal in contour.  There are changes of prior bilateral cataract surgery.  The right optic nerve is slightly smaller than the left, with diffuse increased T2 signal.  There is no abnormal enhancement of the optic nerves.  There is no enhancing mass or inflammatory changes within the orbits.  The extraocular muscles are unremarkable.  The lacrimal glands are symmetric.  Meckel's caves and the cavernous sinuses are unremarkable.  Impression: Evaluation limited by motion artifact.  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  Otherwise no acute abnormality of the orbits.    Electronically signed by: Sakshi Mays  Date:    03/04/2023  Time:    11:06  MRI Brain W WO Contrast  Narrative: EXAMINATION:  MRI BRAIN W WO CONTRAST    CLINICAL HISTORY:  Stroke, follow up;    TECHNIQUE:  Multiplanar, multisequence MR images of the brain were obtained with and without administration of intravenous contrast.    COMPARISON:  MRI brain dated 03/02/2023    FINDINGS:  There is no restricted diffusion, hemorrhage or edema.  Moderate scattered and patchy T2/FLAIR hyperintensities in the subcortical and periventricular white matter, basal ganglia and cerebellum, likely represent chronic microvascular ischemic changes, with multiple prior lacunar infarcts.  There is no abnormal parenchymal or leptomeningeal enhancement.    There is no mass effect or midline shift.  The basal cisterns are patent.  There is mild diffuse parenchymal volume loss.  There is no  hydrocephalus or abnormal extra-axial fluid collection.  The major intracranial flow voids are patent.  The paranasal sinuses are clear.  There is trace right mastoid effusion.  Impression: 1. No acute intracranial abnormality.  2. Moderate chronic microvascular ischemic changes.    Electronically signed by: Sakshi Mays  Date:    03/04/2023  Time:    11:01      Beny Gamez MD   03/04/2023

## 2023-03-04 NOTE — PROGRESS NOTES
"[facility] - 9th Floor Med Surg  Neurology  Progress Note    Patient Name: Porsche Alston  MRN: [id number]  Admission Date: 3/1/2023  Hospital Length of Stay: 3 days  Code Status: Full Code   Attending Provider: Rula Arceo MD  Primary Care Physician: Mabel De NP   Principal Problem:Oculomotor nerve palsy    HPI:   72-year-old female with past medical history of HTN, tobacco use, presented to ED on 3/1 with reports of diplopia and generalized weakness.  She also reports "dragging left side" for quite some time and started following back surgery. CTh showed left basal ganglia lacunar infarct of indeterminate age.  She was admitted to Hospitalist service and neurology was consulted for stroke workup.      Overview/Hospital Course:  No notes on file        Subjective:     Interval History:   Remains with left eye palsy and diplopia, no new issues, awaiting MRI results, family at bedside.     Current Neurological Medications:     Current Facility-Administered Medications   Medication Dose Route Frequency Provider Last Rate Last Admin    acetaminophen tablet 650 mg  650 mg Oral Q6H PRN Mary Cerda MD        amLODIPine tablet 10 mg  10 mg Oral Daily Mary Cerda MD   10 mg at 03/04/23 0850    aspirin EC tablet 81 mg  81 mg Oral Daily Savi Fritz, FNP   81 mg at 03/04/23 0850    atorvastatin tablet 40 mg  40 mg Oral Daily Mary Cerda MD   40 mg at 03/04/23 0850    bisacodyL suppository 10 mg  10 mg Rectal Daily PRN Mary Cerda MD        cyclobenzaprine tablet 10 mg  10 mg Oral Nightly PRN Mary Cerda MD        enoxaparin injection 40 mg  40 mg Subcutaneous Daily Mary Cerda MD   40 mg at 03/03/23 1748    labetaloL injection 10 mg  10 mg Intravenous Q30 Min PRN Mary Cerda MD        losartan tablet 100 mg  100 mg Oral Daily Mary Cerda MD   100 mg at 03/04/23 0850    ondansetron injection 4 mg  4 mg Intravenous Q4H PRN Mary Cerda MD   4 mg at 03/03/23 0950    pantoprazole EC " tablet 40 mg  40 mg Oral Daily Mary Cerda MD   40 mg at 03/04/23 0850    polyethylene glycol packet 17 g  17 g Oral BID PRN Mary Cerda MD        prochlorperazine injection Soln 5 mg  5 mg Intravenous Q6H PRN Mary Cerda MD        senna-docusate 8.6-50 mg per tablet 1 tablet  1 tablet Oral BID PRN Mary Cerda MD        sodium chloride 0.9% flush 10 mL  10 mL Intravenous PRN Mary Cerda MD           Review of Systems   All other systems reviewed and are negative.  Objective:     Vital Signs (Most Recent):  Temp: 98.2 °F (36.8 °C) (03/04/23 0802)  Pulse: 86 (03/04/23 0852)  Resp: 18 (03/04/23 0351)  BP: 130/81 (03/04/23 0852)  SpO2: 96 % (03/04/23 0802) Vital Signs (24h Range):  Temp:  [98 °F (36.7 °C)-98.7 °F (37.1 °C)] 98.2 °F (36.8 °C)  Pulse:  [82-95] 86  Resp:  [16-18] 18  SpO2:  [93 %-98 %] 96 %  BP: (110-148)/() 130/81     Weight: 83.9 kg (185 lb)  Body mass index is 31.76 kg/m².    Physical Exam  Vitals reviewed.   Constitutional:       General: She is not in acute distress.     Appearance: She is not ill-appearing, toxic-appearing or diaphoretic.   HENT:      Head: Normocephalic and atraumatic.      Right Ear: Hearing normal.      Left Ear: Hearing normal.   Eyes:      General: Visual field deficit present.      Extraocular Movements:      Right eye: No nystagmus.      Left eye: Abnormal extraocular motion present.      Pupils: Pupils are equal, round, and reactive to light.   Pulmonary:      Effort: Pulmonary effort is normal.   Musculoskeletal:      Cervical back: Normal range of motion.      Right lower leg: No edema.      Left lower leg: No edema.   Skin:     General: Skin is warm and dry.      Capillary Refill: Capillary refill takes less than 2 seconds.   Neurological:      Mental Status: She is alert.      Cranial Nerves: No dysarthria or facial asymmetry.      Sensory: No sensory deficit.      Motor: No weakness, tremor, atrophy, abnormal muscle tone, seizure activity or pronator  drift.   Psychiatric:         Attention and Perception: Attention normal.         Speech: Speech normal.         Behavior: Behavior is cooperative.       NEUROLOGICAL EXAMINATION:     MENTAL STATUS   Speech: speech is normal     CRANIAL NERVES     CN III, IV, VI   Pupils are equal, round, and reactive to light.    Significant Labs:   Recent Lab Results       None            Significant Imaging: I have reviewed all pertinent imaging results/findings within the past 24 hours.    Assessment and Plan:     * Oculomotor nerve palsy  Diplopia - r/o stroke    -MRI brain negative for acute infarct ... does have old strokes present on imaging.    Awaiting interpretation of MRI brain w/ contrast, MRI orbits w/ w/o   Continue ASA 81mg daily  Continue Atorvastatin 40mg daily  PT/OT/ST       Antithrombotics for secondary stroke prevention: Antiplatelets: Aspirin: 81 mg daily    Statins for secondary stroke prevention and hyperlipidemia, if present: Statins: Atorvastatin- 40 mg daily    Aggressive risk factor modification: HTN, Smoking, HLD     Rehab efforts: The patient has been evaluated by a stroke team provider and the therapy needs have been fully considered based off the presenting complaints and exam findings. The following therapy evaluations are needed: PT evaluate and treat, OT evaluate and treat, SLP evaluate and treat    Diagnostics ordered/pending: None    Stroke workup:  -CTh:   1.  Left basal ganglia lacunar infarct of indeterminate age.  2.  Multiple old lacunar infarcts (right basal ganglia, right external capsule the right peritrigonal location), chronic microvascular ischemia and atrophy  -CTA h/n: No flow-limiting stenosis identified  -MRI brain: No acute intracranial findings identified  -ECHO: EF 65%, bubble study negative  -CUS: negative  -LDL: 152  -A1c: 5.2  -TSH: 6.916  -not on antiplatelet, anticoagulation, or statin therapy at home    VTE prophylaxis: Enoxaparin 40 mg SQ every 24 hours  Mechanical  prophylaxis: Place SCDs    BP parameters: normalize blood pressure        Further recommendations from MD    VTE Risk Mitigation (From admission, onward)         Ordered     enoxaparin injection 40 mg  Daily         03/02/23 0120     Place sequential compression device  Until discontinued         03/02/23 0117                Gloria Ronquillo NP  Neurology  [facility] - 9th Floor Med Surg

## 2023-03-05 PROCEDURE — 25000003 PHARM REV CODE 250: Performed by: INTERNAL MEDICINE

## 2023-03-05 PROCEDURE — 25000003 PHARM REV CODE 250: Performed by: NURSE PRACTITIONER

## 2023-03-05 PROCEDURE — 63600175 PHARM REV CODE 636 W HCPCS: Performed by: INTERNAL MEDICINE

## 2023-03-05 PROCEDURE — 97116 GAIT TRAINING THERAPY: CPT | Mod: CQ

## 2023-03-05 PROCEDURE — 97530 THERAPEUTIC ACTIVITIES: CPT | Mod: CQ

## 2023-03-05 PROCEDURE — 21400001 HC TELEMETRY ROOM

## 2023-03-05 PROCEDURE — 97535 SELF CARE MNGMENT TRAINING: CPT | Mod: CO

## 2023-03-05 RX ORDER — CALCIUM CARBONATE 200(500)MG
1000 TABLET,CHEWABLE ORAL 3 TIMES DAILY PRN
Status: DISCONTINUED | OUTPATIENT
Start: 2023-03-05 | End: 2023-03-06 | Stop reason: HOSPADM

## 2023-03-05 RX ORDER — TRAZODONE HYDROCHLORIDE 50 MG/1
50 TABLET ORAL NIGHTLY
Status: DISCONTINUED | OUTPATIENT
Start: 2023-03-05 | End: 2023-03-06 | Stop reason: HOSPADM

## 2023-03-05 RX ADMIN — PANTOPRAZOLE SODIUM 40 MG: 40 TABLET, DELAYED RELEASE ORAL at 10:03

## 2023-03-05 RX ADMIN — ENOXAPARIN SODIUM 40 MG: 40 INJECTION SUBCUTANEOUS at 05:03

## 2023-03-05 RX ADMIN — Medication 81 MG: at 10:03

## 2023-03-05 RX ADMIN — LOSARTAN POTASSIUM 100 MG: 50 TABLET, FILM COATED ORAL at 10:03

## 2023-03-05 RX ADMIN — ATORVASTATIN CALCIUM 40 MG: 40 TABLET, FILM COATED ORAL at 10:03

## 2023-03-05 RX ADMIN — TRAZODONE HYDROCHLORIDE 50 MG: 50 TABLET ORAL at 08:03

## 2023-03-05 RX ADMIN — AMLODIPINE BESYLATE 10 MG: 5 TABLET ORAL at 10:03

## 2023-03-05 NOTE — PROGRESS NOTES
Patient was not seen today.     -binocular diplopia.     -MRI brain with contrast negative  -MRI orbits: chronic right optic neuropathy   -CSF unremarkable     -Suspect microvascular 3rd nerve palsy, may improve with  time.  -Lumbar spinal stenosis -follow with neurosurgeon Dr. Zazueta    No further recommendations from a neurology standpoint, signing off, please call with questions

## 2023-03-05 NOTE — PROGRESS NOTES
[facility]  Hospital Medicine Progress Note        Chief Complaint: Inpatient follow-up on diplopia    HPI:   This is a 72-year-old  female with medical history of hypertension, noncompliant with medications and Tobacco abuse, more than 45 pack years who presented to the ED with complaints of diplopia that started today, generalized weakness and dragging her left side for the past week.     On arrival to ED she was afebrile, hypertensive, saturating 98% on room air.  EKG showed normal sinus rhythm.  Chest x-ray was unremarkable.  CT head showed left basal ganglia lacunar infarct of indeterminate age as well as multiple old lacunar infarcts involving the right basal ganglia, right external capsule and right peritrigonal location.  CTA head and neck showed no large vessel occlusion.  Labs unremarkable, A1c 5.2, lipid panel pending.     Patient was very promptly admitted to the hospital medicine service.    Bilateral carotid Doppler ultrasound was unrevealing.  Lumbar puncture was unrevealing.    CT of the thorax revealed a small pericardial effusion otherwise unremarkable.    MRI of the lumbar spine revealed significant spinal stenosis  MRI of the cervical spine revealed degenerative changes otherwise unremarkable.  MRI of the brain revealed no acute findings.    MRI of the orbits revealed findings suggestive of chronic right optic neuropathy.    Interval Hx:   Patient is lying of the bed with no new complaints.  She reports that her left eye ophthalmoplegia is improving.  Multiple family members are present visiting.  One of the patient's daughters requests a cardiology evaluation.  Patient is afebrile, on room air, hemodynamically stable.    Objective/physical exam:  General: in no acute distress  HENT: normocephalic, atraumatic  Eye:  Left eye ophthalmoplegia with deviation to the left  Neck: full ROM, no thyromegaly, no JVD  Respiratory: clear to auscultation  bilaterally  Cardiovascular: regular rate and rhythm  Gastrointestinal: non-distended, positive bowel sounds, non-tender  Musculoskeletal: no gross deformity  Integumentary: warm, dry, intact, no rashes  Neurological: cranial nerves grossly intact, bilateral lower extremity paresis worse on the left  Psychiatric: cooperative, flat affect, anxious, depressed      VITAL SIGNS: 24 HRS MIN & MAX LAST   Temp  Min: 98 °F (36.7 °C)  Max: 98.6 °F (37 °C) 98.3 °F (36.8 °C)   BP  Min: 120/77  Max: 145/85 (!) 145/85     Pulse  Min: 78  Max: 94  78   Resp  Min: 18  Max: 18 18   SpO2  Min: 97 %  Max: 98 % 98 %       Recent Labs   Lab 03/01/23 1921 03/02/23  0542   WBC 8.7 7.8   RBC 4.33 4.09*   HGB 12.6 12.1   HCT 39.5 37.4   MCV 91.2 91.4   MCH 29.1 29.6   MCHC 31.9* 32.4*   RDW 12.9 13.2    239   MPV 10.9* 11.3*       Recent Labs   Lab 03/01/23 1921 03/02/23  0542    142   K 3.6 4.0   CO2 22* 23   BUN 8.7* 8.0*   CREATININE 0.74 0.69   CALCIUM 9.8 9.6   MG  --  2.10   ALBUMIN 4.1  --    ALKPHOS 126  --    ALT 19  --    AST 19  --    BILITOT 0.3  --           Microbiology Results (last 7 days)       Procedure Component Value Units Date/Time    Cerebrospinal Fluid Culture [[id number]] Collected: 03/02/23 1607    Order Status: Completed Specimen: CSF (Spinal Fluid) from Cerebrospinal Fluid Updated: 03/05/23 0755     CULTURE, CSF ([facility]) No Growth At 72 Hours    Carol Ink Prep CSF [[id number]] Collected: 03/02/23 1607    Order Status: Completed Specimen: CSF (Spinal Fluid) from Cerebrospinal Fluid Updated: 03/02/23 1821     CAROL INK PREP CSF ([facility]) Negative    Gram Stain [[id number]] Collected: 03/02/23 1607    Order Status: Completed Specimen: CSF (Spinal Fluid) from Lumbar Updated: 03/02/23 1820     GRAM STAIN No WBCs, No bacteria seen    Fungal Culture [[id number]] Collected: 03/02/23 160    Order Status: Sent Specimen: CSF (Spinal Fluid) from Lumbar Updated: 03/02/23 1644    Cryptococcal antigen, CSF [[id number]]  Collected: 03/02/23 1607    Order Status: Canceled Specimen: CSF (Spinal Fluid) from CSF Tap, Tube 1 Updated: 03/02/23 1640             See below for Radiology    Scheduled Med:   amLODIPine  10 mg Oral Daily    aspirin  81 mg Oral Daily    atorvastatin  40 mg Oral Daily    enoxaparin  40 mg Subcutaneous Daily    losartan  100 mg Oral Daily    pantoprazole  40 mg Oral Daily        Continuous Infusions:  None.       PRN Meds:  acetaminophen, bisacodyL, calcium carbonate, cyclobenzaprine, labetalol, ondansetron, polyethylene glycol, prochlorperazine, senna-docusate 8.6-50 mg, sodium chloride 0.9%       Assessment/Plan:  Binocular diplopia  Left third cranial nerve palsy, suspect microvascular etiology  Lumbar spinal stenosis  Multiple old lacunar infarctions  Lower extremity paresis likely due to spinal stenosis   Essential hypertension, suboptimally controlled   Small pericardial effusion   Tobacco abuse      Plan:   Family requested cardiology evaluation  Neurology signed off the case  Follow-up on an outpatient basis with Neurosurgery for spinal stenosis  Continue current medications, physical therapy, occupational therapy, and other supportive care    VTE prophylaxis:     Patient condition:  Stable    Anticipated discharge and Disposition:     Hopefully home tomorrow    All diagnosis and differential diagnosis have been reviewed; assessment and plan has been documented; I have personally reviewed the labs and test results that are presently available; I have reviewed the patients medication list; I have reviewed the consulting providers response and recommendations. I have reviewed or attempted to review medical records based upon their availability    All of the patient's questions have been  addressed and answered. Patient's is agreeable to the above stated plan. I will continue to monitor closely and make adjustments to medical management as  needed.  _____________________________________________________________________      Radiology:  MRI Orbits W W/O Contrast  Narrative: EXAMINATION:  MRI ORBITS W W/O CONTRAST    CLINICAL HISTORY:  Ophthalmoplegia;    TECHNIQUE:  Multiplanar, multisequence MR images of the orbits were obtained with and without administration of intravenous contrast.    COMPARISON:  MRI brain from the same day    FINDINGS:  Evaluation is limited by motion artifact.  The globes are normal in contour.  There are changes of prior bilateral cataract surgery.  The right optic nerve is slightly smaller than the left, with diffuse increased T2 signal.  There is no abnormal enhancement of the optic nerves.  There is no enhancing mass or inflammatory changes within the orbits.  The extraocular muscles are unremarkable.  The lacrimal glands are symmetric.  Meckel's caves and the cavernous sinuses are unremarkable.  Impression: Evaluation limited by motion artifact.  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  Otherwise no acute abnormality of the orbits.    Electronically signed by: Sakshi Mays  Date:    03/04/2023  Time:    11:06  MRI Brain W WO Contrast  Narrative: EXAMINATION:  MRI BRAIN W WO CONTRAST    CLINICAL HISTORY:  Stroke, follow up;    TECHNIQUE:  Multiplanar, multisequence MR images of the brain were obtained with and without administration of intravenous contrast.    COMPARISON:  MRI brain dated 03/02/2023    FINDINGS:  There is no restricted diffusion, hemorrhage or edema.  Moderate scattered and patchy T2/FLAIR hyperintensities in the subcortical and periventricular white matter, basal ganglia and cerebellum, likely represent chronic microvascular ischemic changes, with multiple prior lacunar infarcts.  There is no abnormal parenchymal or leptomeningeal enhancement.    There is no mass effect or midline shift.  The basal cisterns are patent.  There is mild diffuse parenchymal volume loss.  There is no  hydrocephalus or abnormal extra-axial fluid collection.  The major intracranial flow voids are patent.  The paranasal sinuses are clear.  There is trace right mastoid effusion.  Impression: 1. No acute intracranial abnormality.  2. Moderate chronic microvascular ischemic changes.    Electronically signed by: Sakshi Mays  Date:    03/04/2023  Time:    11:01      Beny Gamez MD   03/05/2023

## 2023-03-05 NOTE — PT/OT/SLP PROGRESS
Occupational Therapy   Treatment    Name: Porsche Alston  MRN: [id number]  Admitting Diagnosis:  Oculomotor nerve palsy       Recommendations:     Discharge Recommendations: nursing facility, basic, rehabilitation facility  Discharge Equipment Recommendations:  walker, rolling  Barriers to discharge:       Assessment:     Porsche Alston is a 72 y.o. female with a medical diagnosis of Oculomotor nerve palsy. Performance deficits affecting function are impaired endurance, impaired self care skills, gait instability, visual deficits.     Rehab Prognosis:  Good; patient would benefit from acute skilled OT services to address these deficits and reach maximum level of function.       Plan:     Patient to be seen 5 x/week, 6 x/week to address the above listed problems via self-care/home management, therapeutic activities, therapeutic exercises  Plan of Care Expires:    Plan of Care Reviewed with: patient    Subjective     Pain/Comfort:  Pain Rating 1: 0/10    Objective:     Communicated with: RN prior to session.  Patient found HOB elevated with family present upon OT entry to room.    General Precautions: Standard, fall    Orthopedic Precautions:   Braces: N/A  Respiratory Status: Room air     Occupational Performance:     Bed Mobility:    Patient completed Supine to Sit with minimum assistance     Functional Mobility:   Patient completed Sit <> Stand Transfer with minimum assistance  with rollator  Patient completed Toilet Transfer Min/CGA technique with rollator.  Functional Mobility: Min A for guidance of rollator    Activities of Daily Living:  Grooming: contact guard assistance for hand hygiene.  Lower Body Dressing: minimum assistance to titi/doff undergarment and pants.  Toileting: contact guard assistance        [facility] 6 Click ADL:      Treatment & Education:  Educated Pt on safety and her POC-Pt verbalized understanding.    Patient left sitting edge of bed with call button in reach and family present    GOALS:    Multidisciplinary Problems       Occupational Therapy Goals          Problem: Occupational Therapy    Goal Priority Disciplines Outcome Interventions   Occupational Therapy Goal     OT, PT/OT Ongoing, Progressing    Description: Goals to be met by: 3/16/2023     Patient will increase functional independence with ADLs by performing:    UE Dressing with Modified [place].  LE Dressing with Modified [place].  Grooming while standing with Modified [place].  Toileting from bedside commode with Modified [place] for hygiene and clothing management.   Toilet transfer to toilet with Modified [place].  Increased functional strength to 5/5 for LUE.                         Time Tracking:     OT Date of Treatment: 03/05/23  OT Start Time: 1153  OT Stop Time: 1210  OT Total Time (min): 17 min    Billable Minutes:Self Care/Home Management 17    OT/JORDON: JORDON     JORDON Visit Number: 1    3/5/2023

## 2023-03-05 NOTE — PLAN OF CARE
Problem: Adult Inpatient Plan of Care  Goal: Plan of Care Review  Outcome: Ongoing, Progressing  Goal: Patient-Specific Goal (Individualized)  Outcome: Ongoing, Progressing  Goal: Absence of Hospital-Acquired Illness or Injury  Outcome: Ongoing, Progressing  Goal: Optimal Comfort and Wellbeing  Outcome: Ongoing, Progressing  Goal: Readiness for Transition of Care  Outcome: Ongoing, Progressing     Problem: Infection  Goal: Absence of Infection Signs and Symptoms  Outcome: Ongoing, Progressing     Problem: Adjustment to Illness (Stroke, Ischemic/Transient Ischemic Attack)  Goal: Optimal Coping  Outcome: Met     Problem: Bowel Elimination Impaired (Stroke, Ischemic/Transient Ischemic Attack)  Goal: Effective Bowel Elimination  Outcome: Met     Problem: Cerebral Tissue Perfusion (Stroke, Ischemic/Transient Ischemic Attack)  Goal: Optimal Cerebral Tissue Perfusion  Outcome: Met     Problem: Cognitive Impairment (Stroke, Ischemic/Transient Ischemic Attack)  Goal: Optimal Cognitive Function  Outcome: Met     Problem: Communication Impairment (Stroke, Ischemic/Transient Ischemic Attack)  Goal: Improved Communication Skills  Outcome: Met     Problem: Functional Ability Impaired (Stroke, Ischemic/Transient Ischemic Attack)  Goal: Optimal Functional Ability  Outcome: Met     Problem: Respiratory Compromise (Stroke, Ischemic/Transient Ischemic Attack)  Goal: Effective Oxygenation and Ventilation  Outcome: Met     Problem: Sensorimotor Impairment (Stroke, Ischemic/Transient Ischemic Attack)  Goal: Improved Sensorimotor Function  Outcome: Met     Problem: Swallowing Impairment (Stroke, Ischemic/Transient Ischemic Attack)  Goal: Optimal Eating and Swallowing without Aspiration  Outcome: Met     Problem: Urinary Elimination Impaired (Stroke, Ischemic/Transient Ischemic Attack)  Goal: Effective Urinary Elimination  Outcome: Met     Problem: Skin Injury Risk Increased  Goal: Skin Health and Integrity  Outcome: Ongoing,  Progressing

## 2023-03-05 NOTE — PT/OT/SLP PROGRESS
Physical Therapy Treatment    Patient Name:  Porsche Alston   MRN:  [id number]    Recommendations:     Discharge Recommendations:  rehabilitation facility, nursing facility, skilled (pending progress)   Discharge Equipment Recommendations: walker, rolling     Subjective     Patient awake.     Communicated with NSG prior to session to see if Pt is appropriate for therapy today. Pt greeted and agreed to session.    Objective:     General Precautions: Standard, fall   Orthopedic Precautions:N/A   Braces:    Respiratory Status: Room air     Functional Mobility:    Bed Mobility: Min Assist  Sit to Stand: Min Assist  Transfers: Min Assist  Gait: 40 Ft. Min Assist   Equipment Used: Gait belt, Rolling walker    Assessment:     Porsche Alston is a 72 y.o. female admitted with a medical diagnosis of Oculomotor nerve palsy.     Treatment Encounter Note:  Patient actively participated with treatment today with no adverse effects. Pt ambulated 40 ft min a with Rolator. Patient left supine with all lines intact and call button in reach.    Rehab Prognosis: Good; patient would benefit from acute skilled PT services to address these deficits and reach maximum level of function.    Recent Surgery: * No surgery found *    GOALS:   Multidisciplinary Problems       Physical Therapy Goals          Problem: Physical Therapy    Goal Priority Disciplines Outcome Goal Variances Interventions   Physical Therapy Goal    High PT, PT/OT Ongoing, Progressing     Description: Goals to be met by: 4/3/2023     Patient will increase functional independence with mobility by performin. Supine to sit with [place]  2. Sit to stand transfer with Modified [place]  3. Gait  x 150 feet with Modified [place] using Rolling Walker.   4. Ascend/descend 4 stair with left Handrails Modified [place].                          Plan:     During this hospitalization, patient to be seen 6 x/week to address the identified rehab impairments via gait  training, therapeutic activities, therapeutic exercises, neuromuscular re-education and progress toward the following goals:    Plan of Care Expires:  04/03/23    Billable Minutes     Billable Minutes: Gait Training 12 and Therapeutic Activity 11    Treatment Type: Treatment  PT/PTA: PTA     PTA Visit Number: 1     03/05/2023

## 2023-03-06 VITALS
HEART RATE: 94 BPM | DIASTOLIC BLOOD PRESSURE: 74 MMHG | OXYGEN SATURATION: 98 % | RESPIRATION RATE: 20 BRPM | WEIGHT: 185 LBS | SYSTOLIC BLOOD PRESSURE: 121 MMHG | HEIGHT: 64 IN | TEMPERATURE: 98 F | BODY MASS INDEX: 31.58 KG/M2

## 2023-03-06 PROCEDURE — 97116 GAIT TRAINING THERAPY: CPT | Mod: CQ

## 2023-03-06 PROCEDURE — 97530 THERAPEUTIC ACTIVITIES: CPT

## 2023-03-06 PROCEDURE — 25000003 PHARM REV CODE 250: Performed by: NURSE PRACTITIONER

## 2023-03-06 PROCEDURE — 25000003 PHARM REV CODE 250: Performed by: INTERNAL MEDICINE

## 2023-03-06 RX ORDER — ASPIRIN 81 MG/1
81 TABLET ORAL DAILY
Qty: 100 TABLET | Refills: 0 | Status: SHIPPED | OUTPATIENT
Start: 2023-03-07

## 2023-03-06 RX ORDER — ATORVASTATIN CALCIUM 40 MG/1
40 TABLET, FILM COATED ORAL DAILY
Qty: 90 TABLET | Refills: 5 | Status: SHIPPED | OUTPATIENT
Start: 2023-03-07

## 2023-03-06 RX ADMIN — PANTOPRAZOLE SODIUM 40 MG: 40 TABLET, DELAYED RELEASE ORAL at 09:03

## 2023-03-06 RX ADMIN — AMLODIPINE BESYLATE 10 MG: 5 TABLET ORAL at 09:03

## 2023-03-06 RX ADMIN — LOSARTAN POTASSIUM 100 MG: 50 TABLET, FILM COATED ORAL at 09:03

## 2023-03-06 RX ADMIN — ATORVASTATIN CALCIUM 40 MG: 40 TABLET, FILM COATED ORAL at 09:03

## 2023-03-06 RX ADMIN — Medication 81 MG: at 09:03

## 2023-03-06 NOTE — PLAN OF CARE
03/06/23 1125   Final Note   Assessment Type Final Discharge Note   Anticipated Discharge Disposition Home-Health   Hospital Resources/Appts/Education Provided Post-Acute resouces added to AVS   Post-Acute Status   Post-Acute Authorization Home Health   Home Health Status Set-up Complete/Auth obtained  ([facility] 1st Option)

## 2023-03-06 NOTE — NURSING
Entered pt room at shift change for [facility] with dayshift nurse, pt in room with family, including one of daughter's who is a nurse. Daughter requested before DC Cardiology to see pt or to have OP appt set up due to mild pericardial effusion. Educated that usually the CM makes those appt prior to DC but would make a note to reassure them and so it doesn't get missed. Everyone is very pleasant and verbalizes understanding. During rounds around [id number] went to give trazodone (that was requested by pt/family due to lack of sleep) and a second sister had arrived. Stated she was also a nurse. Wanted to know how often neuro checks were being done, notified her they are ordered q4h.Wanted to know why giving 50mg and not 100mg, educated entire room that we always start any medication at the lowest dose and can titrate to their needs based on their response to the medication. She then asked what SE I would be monitoring for that would prevent the 100mg dose. I explained she is getting the medication for insomnia so with all sleeping additives we monitor for respiratory depression. She told me I didn't know what I was talking about because it is an antidepressant and those don't cause respiratory depression. I again educated her she is getting it for an off label use: insomnia, and it is appropriate to monitor Resp status due to CNS depression. She wanted literature to support what I told her.   Pt stated not ready for sleeping agent yet told me to finish passing meds for other patients and she would call me when ready. Pt called later to say was ready for trazodone. Upon entering room with medication, daughter was questioning my education as a nurse. 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 She told me I was mad for questioning her. I told her no that I was merely answering her questions and attempting to reassure her.  Later  "brought the daughter the literature she requested that I went through and highlighted the parts that supported the education I earlier provided. She told me to read it to her. I told her. "Ma'am, I'm not going to read it to you it is very lengthy, I took the liberty to highlight the relevant parts. You can read it for yourself. You asked for supporting literature and I provided it to you." She then became very verbally aggressive in tone telling me I have a problem and am defensive because she asked for a higher dose (100mg). I told her that wasn't true. I didn't care what dose she received personally. I am following MD orders and assessing my pt as appropriate to give her appropriate care. She said she didn't think I was giving good care because if I was so worried that a medicine I was giving her was going to kill her mother then I should at least have her on a pulse ox probe. I told her I wasn't going to do this with her, the back and forth, that she was looking to pick a fight and is mis-stating what occurred and was said. As a nurse she knows that q4 VS is appropriate on the floor and that we round on pt (nurses q2 and aids q2, alternating even/odd so therefore every hour there are eyes on the pt) and the patient is stable and not displaying any signs of distress for her to require pulse ox monitoring. Should the pt start to develop signs of respiratory distress then we would put on a pulse ox and appropriate oxygen requirements or move her to a unit better equipped for her resp needs in that scenario. She told me she wanted to report me because she does not trust I am giving appropriate care to her mother. That I intentionally gave a med (that she requested at a lower dose than she wanted) that I felt was going to harm her mother and did not provide the appropriate monitoring. I told her I never said that and she was manipulating my words to start an argument.. She at this point had a raised voice the whole " time and told me she wanted to report me to my charge nurse. I told her Hi My name is Dori, I am your charge nurse, like I mentioned earlier when I introduced myself. She said then I want your manager now. I informed her managers do not work at night and she yelled at me that she will report me and have me fired. I told her I could call the house supervisor but due to her aggressive nature ( the yelling and antagonizing) that I would be having her escorted off my unit by security as she is not the patient and does not need to be here and is interfering with pt care at this point by constantly arguing with the staff when she doesn't get the response she demands. The CNA, tamie, said when she rounded the daughter was hostile and complaining (that was when she was waiting for the literature.) She described the daughter as difficult. Daughter felt because I didn't announce I was doing a neuro check that they weren't being done. Although I assessed pt's neuro status while interacting with patient, pt answered all questions appropriately, was tracking me as I walked around the room, was wearing glasses with tape on L lens. No facial droop. Assessed pt pulses during my physical assessment. Watched pt ambulate with walker down nolasco with first pleasant sister.   Lopez, [place] supervisor notified the daughter wanted to speak to him. Lopez spoke with family. They told him if me being pregnant and not feeling good was the issue then I need to go home... (I am not being falsely accuse of not feeling well and discriminated against because I am pregnant simply because I provided education that they disagreed with and when the literature supported my education the story changed.) The daughter also told Lopez she wasn't sure if it had something to do (my references to respiratory depression) with diprivan, which that rationale does not hold up because the pt is not receiving diprivan and that is not even appropriate for the floor.  Nurse gave report to gabo and took one of her patient's to avoid further antagonization. Daughter said she would be leaving (they have 3 visitors in the room, which is a violation of the night time visitation policy regardless.)

## 2023-03-06 NOTE — PT/OT/SLP PROGRESS
Physical Therapy Treatment    Patient Name:  Porsche Alston   MRN:  [id number]    Recommendations:     Discharge Recommendations: home with home health  Discharge Equipment Recommendations: walker, rolling  Barriers to discharge: None    Assessment:     Porsche Alston is a 72 y.o. female admitted with a medical diagnosis of Oculomotor nerve palsy.  She presents with the following impairments/functional limitations: impaired endurance, impaired balance .    Rehab Prognosis: Good; patient would benefit from acute skilled PT services to address these deficits and reach maximum level of function.    Recent Surgery: * No surgery found *      Plan:     During this hospitalization, patient to be seen 6 x/week to address the identified rehab impairments via gait training, therapeutic activities and progress toward the following goals:    Plan of Care Expires:  04/03/23    Subjective     Chief Complaint: I am feeling so much better today  Patient/Family Comments/goals:   Pain/Comfort:         Objective:     Communicated with nurse prior to session.  Patient found sitting edge of bed with pulse ox (continuous), telemetry, peripheral IV upon PT entry to room.     General Precautions: Standard, fall  Orthopedic Precautions: N/A  Braces: N/A  Respiratory Status: Room air  Blood Pressure:   Skin Integrity:       Functional Mobility:  Transfers:     Sit to Stand:  modified independence with 4 wheeled walker  Gait: pt amb x150ft with rollator and [place] with no unsteadiness or LOB noted        Education:  Patient provided with verbal education regarding safety awareness during functional mobility.  Understanding was verbalized.     Patient left sitting edge of bed with all lines intact and call button in reach..    GOALS:   Multidisciplinary Problems       Physical Therapy Goals          Problem: Physical Therapy    Goal Priority Disciplines Outcome Goal Variances Interventions   Physical Therapy Goal    High PT, PT/OT Ongoing, Progressing      Description: Goals to be met by: 4/3/2023     Patient will increase functional independence with mobility by performin. Supine to sit with [place]  2. Sit to stand transfer with Modified [place]  3. Gait  x 150 feet with Modified [place] using Rolling Walker.   4. Ascend/descend 4 stair with left Handrails Modified [place].                          Time Tracking:     PT Received On: 23  PT Start Time: 1044     PT Stop Time: 1058  PT Total Time (min): 14 min     Billable Minutes: Gait Training 14    Treatment Type: Treatment  PT/PTA: PTA     PTA Visit Number: 2     2023

## 2023-03-06 NOTE — PT/OT/SLP PROGRESS
Occupational Therapy   Treatment    Name: Porsche Alston  MRN: [id number]  Admitting Diagnosis:  Oculomotor nerve palsy       Recommendations:     Discharge Recommendations: home with home health  Discharge Equipment Recommendations:  walker, rolling  Barriers to discharge:  None    Assessment:     Porsche Alston is a 72 y.o. female with a medical diagnosis of diplopia d/t suspected 3rd nerve palsy; negative CVA w/u.  She presents with resolved diplopia this AM, normal extra occular ROM, and improved visual scanning during functional mobility. Performance deficits affecting function are impaired balance, impaired endurance.     Rehab Prognosis:  Good; patient would benefit from acute skilled OT services to address these deficits and reach maximum level of function.       Plan:     Patient to be seen  (1-2 more visits) to address the above listed problems via self-care/home management, therapeutic activities, therapeutic exercises  Plan of Care Expires: 03/10/23  Plan of Care Reviewed with: patient, family    Subjective     Pain/Comfort:  Pain Rating 1: 0/10    Objective:     Patient found sitting edge of bed   with family present upon OT entry to room.    General Precautions: Standard, fall    Orthopedic Precautions:N/A  Braces: N/A     Occupational Performance:     Functional Mobility Training/Transfer Training:  Sit<>Stand Transition: modified independence and rollator   Functional Mobility to<>from bathroom: modified independence and rollator   Household level functional mobility with visual scanning to avoid obstacles in crowded environment: SPV with rollator     Activities of Daily Living Training:  ADL simulation performed as pt was already dressed. With simulation, pt with no noted difficulty with UB/LE dressing or toileting.    [facility] 6 Click ADL  Total Score: 24    Additional Treatment:  Vision reassessment:  Pt reports that double vision is resolved without partial patching. Extraoccular movements tested  RN NOTES:

SEEN PT SLEEPING AT THIS TIME, APPEARS CALM AND COMFORTABLE, RESPIRATION EVEN AND UNLABORED, 
RESTRAINT REMAINS RELEASED binocularly and monocularly. B eyes WFL in all planes of movement for pursuits, saccades, and convergence/divergence. VF intact. Reading WFL for large print. Scanning WFL at tabletop and environment level.       Patient/Caregiver Education:  Patient and family provided with verbal education regarding OT role/goals/POC, fall prevention, and safety awareness.  Understanding was verbalized.      Patient left sitting edge of bed with  family present    GOALS:   Multidisciplinary Problems       Occupational Therapy Goals          Problem: Occupational Therapy    Goal Priority Disciplines Outcome Interventions   Occupational Therapy Goal     OT, PT/OT Ongoing, Progressing    Description: Goals to be met by: 3/16/2023     Patient will increase functional independence with ADLs by performing:    UE Dressing with Modified [place].  LE Dressing with Modified [place].  Grooming while standing with Modified [place].  Toileting from bedside commode with Modified [place] for hygiene and clothing management.   Toilet transfer to toilet with Modified [place].  Increased functional strength to 5/5 for LUE.                         Time Tracking:     OT Date of Treatment: 03/06/23  OT Start Time: 1025  OT Stop Time: 1040  OT Total Time (min): 15 min    Billable Minutes:Therapeutic Activity 1    OT/JORDON: OT     JORDON Visit Number: 3    3/6/2023

## 2023-03-06 NOTE — CONSULTS
[facility] - 9th [facility] Med Surg  Cardiology  Consult Note    Patient Name: Porsche Alston  MRN: [id number]  Admission Date: 3/1/2023  Hospital Length of Stay: 5 days  Code Status: Full Code   Attending Provider: Dr. Merino  Consulting Provider: Ada Salinas MD  Primary Care Physician: Mabel De NP  Principal Problem:Oculomotor nerve palsy    Patient information was obtained from patient, relative(s), ER records, and primary team.     Subjective:     Chief Complaint:  Diplopia     HPI:   Pt is a 73 y/o female with PMH of HTN, Tobacco use who was admitted with diplopia and left sided weakness on 3/1/23. Pt found to have multiple lacunar infarcts and left thrd cranial palsy. On CT imaging a moderate pericardial effusion of was found prompting echocardiogram evaluation. Daughter requested Cardiology consult 2/2 to pericardial effusion.   Pt reports feeling much improved since admission. Occasional chronic exertional dyspnea. No LE edema, no SOB, no chest pain.       Previous Cardiac Diagnostics:     Echo 3/2/23:  Concentric hypertrophy and normal systolic function.  Mild tricuspid regurgitation.  Normal right ventricular size with normal right ventricular systolic function.  The estimated ejection fraction is 65%.  Grade I left ventricular diastolic dysfunction.  Small pericardial effusion located near the right ventricle.  There is no evidence of intracardiac shunting.    CV U/S  Carotid B/L  The right internal carotid artery demonstrated no hemodynamically significant stenosis.  The left internal carotid artery demonstrated no hemodynamically significant stenosis.   The bilateral vertebral arteries were patent with antegrade flow.    History reviewed. No pertinent past medical history.    No past surgical history on file.    Review of patient's allergies indicates:  No Known Allergies    No current facility-administered medications on file prior to encounter.     Current Outpatient Medications on  File Prior to Encounter   Medication Sig    amLODIPine (NORVASC) 10 MG tablet Take 10 mg by mouth once daily.    cyclobenzaprine (FLEXERIL) 5 MG tablet Take 10 mg by mouth nightly as needed for Muscle spasms.    esomeprazole (NEXIUM) 40 MG capsule Take 40 mg by mouth before breakfast.    losartan (COZAAR) 100 MG tablet Take 100 mg by mouth once daily.    mirabegron (MYRBETRIQ) 50 mg Tb24 Take by mouth.    vitamin D (VITAMIN D3) 1000 units Tab Take 2,000 Units by mouth once daily.     Family History    None       Tobacco Use    Smoking status: Unknown    Smokeless tobacco: Not on file   Substance and Sexual Activity    Alcohol use: Not Currently    Drug use: Not Currently    Sexual activity: Not on file       Review of Systems   Constitutional:  Negative for appetite change, fatigue and fever.   Eyes:  Negative for visual disturbance.   Respiratory:  Negative for chest tightness and shortness of breath.    Cardiovascular:  Negative for chest pain, palpitations and leg swelling.   Neurological:  Positive for weakness. Negative for dizziness, syncope, light-headedness and headaches.     Objective:     Vital Signs (Most Recent):  Temp: 99.3 °F (37.4 °C) (03/06/23 0758)  Pulse: 100 (03/06/23 0758)  Resp: 20 (03/06/23 0036)  BP: 131/74 (03/06/23 0758)  SpO2: 97 % (03/06/23 0758) Vital Signs (24h Range):  Temp:  [97.6 °F (36.4 °C)-99.3 °F (37.4 °C)] 99.3 °F (37.4 °C)  Pulse:  [] 100  Resp:  [18-20] 20  SpO2:  [94 %-98 %] 97 %  BP: (108-145)/(68-85) 131/74     Weight: 83.9 kg (185 lb)  Body mass index is 31.76 kg/m².    SpO2: 97 %         Intake/Output Summary (Last 24 hours) at 3/6/2023 0940  Last data filed at 3/5/2023 1300  Gross per 24 hour   Intake 660 ml   Output --   Net 660 ml       Lines/Drains/Airways       Peripheral Intravenous Line  Duration                  Peripheral IV - Single Lumen 03/05/23 22 G Posterior;Right Hand 1 day                    Significant Labs:  No results found for this or any previous  visit (from the past 72 hour(s)).    Significant Imaging:  Imaging Results              MRI Brain Without Contrast (Final result)  Result time 03/02/23 08:12:07      Final result by Geovani Jaffe MD (03/02/23 08:12:07)                   Impression:      1.  No acute intracranial findings identified.    2.  Multiple old infarcts, chronic microangiopathic ischemia and atrophy.    No significant discrepancy with overnight report.    .      Electronically signed by: Geovani Jaffe  Date:    03/02/2023  Time:    08:12               Narrative:    EXAMINATION:  MRI BRAIN WITHOUT CONTRAST    CLINICAL HISTORY:  Transient ischemic attack (TIA);Stroke, follow up;    TECHNIQUE:  Multiplanar MRI sequences were performed of the brain without contrast.    COMPARISON:  CT brain without contrast March 1, 2023.    FINDINGS:  Bilateral cerebral periventricular and subcortical white matter variable size T2-FLAIR hyperintense signals are consistent with moderate chronic microangiopathic ischemia.  There are bilateral basal ganglia and right deep white matter old lacunar infarcts.  There is also minimal old infarct right cerebellum.  Involutional changes contribute to cerebellar and cerebral cortical volume loss. Gradient echo sequences demonstrate no evidence of de phasing artifact to suggest hemorrhagic byproducts. No evidence of diffusion restriction or ADC map signal drop out to suggest acute infarct.    The cerebellar tonsils are normally positioned. There is no acute intracranial hemorrhage, hydrocephalus, midline shift or mass effect. No acute extra axial fluid collections identified. The mastoid air cells are clear.                        Preliminary result by Geovani Jaffe MD (03/02/23 05:49:50)                   Narrative:    START OF REPORT:  TECHNIQUE: MULTIPLANAR, MULTISEQUENCE MAGNETIC RESONANCE IMAGING OF THE BRAIN WAS PERFORMED WITHOUT INTRAVENOUS CONTRAST.    COMPARISON: NONE.    CLINICAL HISTORY: DIZZYNESS, WEAKNESS,  NAUSEA, BLURRED VISION.    Findings:  Hemorrhage: No acute intracranial hemorrhage is identified.  Stroke: No abnormal signal is identified on the diffusion images to suggest acute infarct.  Extra axial spaces: The ventricles sulci and basal cisterns are within normal limits.  Cerebellopontine angles: Normal.  Cerebral, cerebellar, and brainstem parenchyma: Moderate periventricular and subcortical white matter signal abnormalities are seen. The main consideration is small vessel ischemic changes in a patient of this age. A 1.1 cm lacunar infarct is noted in right caudate nucleusAs seen on these 6, image 12.  Cranial nerves: Normal.  Herniation: None.  Calvarium: Within normal limits.  Vascular system: Normal flow voids.  Visualized paranasal sinuses: Mild mucosal thickening in the frontal and ethmoid sinuses.  Visualized orbits: The visualized orbits appear unremarkable.  Visualized upper cervical spine: Normal.  Sella and skull base: Normal.  Temporal bones and mastoids: Within normal limits.      Impression:  1. No acute intracranial process is identified. Details and findings as above.                          Preliminary result by Reggie Nolasco Jr., MD (03/02/23 05:49:50)                   Narrative:    START OF REPORT:  TECHNIQUE: MULTIPLANAR, MULTISEQUENCE MAGNETIC RESONANCE IMAGING OF THE BRAIN WAS PERFORMED WITHOUT INTRAVENOUS CONTRAST.    COMPARISON: NONE.    CLINICAL HISTORY: DIZZYNESS, WEAKNESS, NAUSEA, BLURRED VISION.    Findings:  Hemorrhage: No acute intracranial hemorrhage is identified.  Stroke: No abnormal signal is identified on the diffusion images to suggest acute infarct.  Extra axial spaces: The ventricles sulci and basal cisterns are within normal limits.  Cerebellopontine angles: Normal.  Cerebral, cerebellar, and brainstem parenchyma: Moderate periventricular and subcortical white matter signal abnormalities are seen. The main consideration is small vessel ischemic changes in a  patient of this age. A 1.1 cm lacunar infarct is noted in right caudate nucleusAs seen on these 6, image 12.  Cranial nerves: Normal.  Herniation: None.  Calvarium: Within normal limits.  Vascular system: Normal flow voids.  Visualized paranasal sinuses: Mild mucosal thickening in the frontal and ethmoid sinuses.  Visualized orbits: The visualized orbits appear unremarkable.  Visualized upper cervical spine: Normal.  Sella and skull base: Normal.  Temporal bones and mastoids: Within normal limits.      Impression:  1. No acute intracranial process is identified. Details and findings as above.                                         CTA Head and Neck (xpd) (Final result)  Result time 03/02/23 08:07:21      Final result by Geovani Jaffe MD (03/02/23 08:07:21)                   Impression:    Impression:    No flow-limiting stenosis identified.  Details of other findings as above.    No significant discrepancy with overnight report.      Electronically signed by: Geovani Jaffe  Date:    03/02/2023  Time:    08:07               Narrative:      TECHNIQUE:CT ANGIOGRAM OF THE INTRACRANIAL VESSELS WAS PERFORMED WITHOUT AND WITH INTRAVENOUS CONTRAST WITH DIRECT AXIAL AS WELL AS SAGITTAL AND CORONAL REFORMATIONS. CT ANGIOGRAM OF THE NECK VESSELS WAS PERFORMED WITHOUT AND WITH INTRAVENOUS CONTRAST WITH DIRECT AXIAL AS WELL AS SAGITTAL AND CORONAL REFORMATIONS.  MIP and MPR images were also reconstructed.    Automated exposure control was utilized to minimize radiation dose.  DLP 1405.    COMPARISON:COMPARISON IS WITH STUDY DATED 2023-03-01 19:21:19.    CLINICAL HISTORY:BLURRED VISION (BLURRY VISION AND VOMITING STARTED AFTER 1PM, NOT FEELING WELL FOR A FEW DAYS, LEFT LEG HEAVINESS SINCE LAST WEEK. LEFT EYE VISION IS WORSE, LEFT EYE ROLLING TO LEFT WITH NSTAGMUS LEFT UPPER EXTREMITY WEAKER THAN RIGHT IN TRIAGE.).    Findings:    Carotid arteries are assessed in accordance with the NASCET criteria.    Intracranial Vascular  structures:    Internal carotid arteries:Mild atheromatous calcification of the cavernous and supraclinoid segments of the internal carotid artery is seen with mild stenosis of the left supraclinoid segment (series 17, image 259).    Middle cerebral arteries:Unremarkable.    Anterior cerebral arteries:Unremarkable.    Vertebral arteries:Unremarkable.    Basilar artery:Unremarkable.    Posterior cerebral arteries:There is fetal origin of the right posterior cerebral artery with a hypoplastic P1 segment. The left posterior cerebral artery is unremarkable.    Neck Vascular structures:Mild atheromatous calcification of the arch of the aorta is seen.    Carotids:    Common carotid arteries:Unremarkable.    Internal carotid artery:Unremarkable.    Vertebral arteries:Unremarkable.    Brain parenchyma:No abnormal intracranial enhancement is seen on the post contrast images. Again noted are old lacunar infarcts in bilateral basal ganglia.    Miscellaneous:Enlarged thyroid gland with calcified and non-calcified nodules.                        Preliminary result by Geovani Jaffe MD (03/01/23 23:07:31)                   Narrative:    START OF REPORT:  TECHNIQUE: CT ANGIOGRAM OF THE INTRACRANIAL VESSELS WAS PERFORMED WITHOUT AND WITH INTRAVENOUS CONTRAST WITH DIRECT AXIAL AS WELL AS SAGITTAL AND CORONAL REFORMATIONS. CT ANGIOGRAM OF THE NECK VESSELS WAS PERFORMED WITHOUT AND WITH INTRAVENOUS CONTRAST WITH DIRECT AXIAL AS WELL AS SAGITTAL AND CORONAL REFORMATIONS.    COMPARISON: COMPARISON IS WITH STUDY [id number] 19:21:19.    CLINICAL HISTORY: BLURRED VISION (BLURRY VISION AND VOMITING STARTED AFTER 1PM, NOT FEELING WELL FOR A FEW DAYS, LEFT LEG HEAVINESS SINCE LAST WEEK. LEFT EYE VISION IS WORSE, LEFT EYE ROLLING TO LEFT WITH NSTAGMUS LEFT UPPER EXTREMITY WEAKER THAN RIGHT IN TRIAGE.).    Findings:  Intracranial Vascular structures:  Internal carotid arteries: Mild atheromatous calcification of the cavernous and  supraclinoid segments of the internal carotid artery is seen with mild stenosis of the left supraclinoid segment (series 17, image 259).  Middle cerebral arteries: Unremarkable.  Anterior cerebral arteries: Unremarkable.  Vertebral arteries: Unremarkable.  Basilar artery: Unremarkable.  Posterior cerebral arteries: There is fetal origin of the right posterior cerebral artery with a hypoplastic P1 segment. The left posterior cerebral artery is unremarkable.  Neck Vascular structures: Mild atheromatous calcification of the arch of the aorta is seen.  Carotids:  Common carotid arteries: Unremarkable.  Internal carotid artery: Unremarkable.  Vertebral arteries: Unremarkable.  Brain parenchyma: No abnormal intracranial enhancement is seen on the post contrast images. Again noted are old lacunar infarcts in bilateral basal ganglia.    Miscellaneous: Enlarged thyroid gland with calcified and non-calcified nodules.      Impression:  1. Unremarkable CT angiogram of the head and neck. Details and other findings as described above.                          Preliminary result by Geovani Jaffe MD (03/01/23 23:07:31)                   Narrative:    START OF REPORT:  TECHNIQUE: CT ANGIOGRAM OF THE INTRACRANIAL VESSELS WAS PERFORMED WITHOUT AND WITH INTRAVENOUS CONTRAST WITH DIRECT AXIAL AS WELL AS SAGITTAL AND CORONAL REFORMATIONS. CT ANGIOGRAM OF THE NECK VESSELS WAS PERFORMED WITHOUT AND WITH INTRAVENOUS CONTRAST WITH DIRECT AXIAL AS WELL AS SAGITTAL AND CORONAL REFORMATIONS.    COMPARISON: COMPARISON IS WITH STUDY [id number] 19:21:19.    CLINICAL HISTORY: BLURRED VISION (BLURRY VISION AND VOMITING STARTED AFTER 1PM, NOT FEELING WELL FOR A FEW DAYS, LEFT LEG HEAVINESS SINCE LAST WEEK. LEFT EYE VISION IS WORSE, LEFT EYE ROLLING TO LEFT WITH NSTAGMUS LEFT UPPER EXTREMITY WEAKER THAN RIGHT IN TRIAGE.).    Findings:  Intracranial Vascular structures:  Internal carotid arteries: Mild atheromatous calcification of the cavernous  and supraclinoid segments of the internal carotid artery is seen with mild stenosis of the left supraclinoid segment (series 17, image 259).  Middle cerebral arteries: Unremarkable.  Anterior cerebral arteries: Unremarkable.  Vertebral arteries: Unremarkable.  Basilar artery: Unremarkable.  Posterior cerebral arteries: There is fetal origin of the right posterior cerebral artery with a hypoplastic P1 segment. The left posterior cerebral artery is unremarkable.  Neck Vascular structures: Mild atheromatous calcification of the arch of the aorta is seen.  Carotids:  Common carotid arteries: Unremarkable.  Internal carotid artery: Unremarkable.  Vertebral arteries: Unremarkable.  Brain parenchyma: No abnormal intracranial enhancement is seen on the post contrast images. Again noted are old lacunar infarcts in bilateral basal ganglia.    Miscellaneous: Enlarged thyroid gland with calcified and non-calcified nodules.      Impression:  1. Unremarkable CT angiogram of the head and neck. Details and other findings as described above.                                         CT Head Without Contrast (Final result)  Result time 03/01/23 19:42:32      Final result by Geovani Jaffe MD (03/01/23 19:42:32)                   Impression:      1.  Left basal ganglia lacunar infarct of indeterminate age.    2.  Multiple old lacunar infarcts, chronic microvascular ischemia and atrophy.      Electronically signed by: Geovani Jaffe  Date:    03/01/2023  Time:    19:42               Narrative:    EXAMINATION:  CT HEAD WITHOUT CONTRAST    CLINICAL HISTORY:  Neuro deficit, acute, stroke suspected;    TECHNIQUE:  Sequential axial images were performed of the brain without contrast.    Dose product length of 995 mGycm. Automated exposure control was utilized to minimize radiation dose.    COMPARISON:  None available.    FINDINGS:  There is no intracranial mass effect, midline shift, hydrocephalus or hemorrhage. There is no sulcal effacement  or low attenuation changes to suggest recent large vessel territory infarction.  There are old infarcts involve the right basal ganglia, right external capsule the right peritrigonal location.  There is also left basal ganglia lacunar infarct of indeterminate age.  Chronic appearing periventricular and subcortical white matter low attenuation changes are present and are consistent with chronic microangiopathic ischemia. The ventricular system and sulcal markings prominence is consistent with atrophy. There is no acute extra axial fluid collection. Visualized paranasal sinuses are clear without mucosal thickening, polypoidal abnormality or air-fluid levels. Mastoid air cells aeration is optimal.                                       X-Ray Chest 1 View (Final result)  Result time 03/01/23 19:01:11      Final result by Geovani Jaffe MD (03/01/23 19:01:11)                   Impression:      NO ACUTE CARDIOPULMONARY PROCESS IDENTIFIED.      Electronically signed by: Geovani Jaffe  Date:    03/01/2023  Time:    19:01               Narrative:    EXAMINATION:  XR CHEST 1 VIEW    CLINICAL HISTORY:  Weakness    TECHNIQUE:  One view    COMPARISON:  None available.    FINDINGS:  Cardiopericardial silhouette is mildly enlarged.  Lungs are without dense focal or segmental consolidation, congestive process, pleural effusions or pneumothorax.                                      EKG:    Results for orders placed or performed during the hospital encounter of 03/01/23   EKG 12-lead    Narrative    Test Reason : R53.1,    Vent. Rate : 087 BPM     Atrial Rate : 087 BPM     P-R Int : 172 ms          QRS Dur : 076 ms      QT Int : 380 ms       P-R-T Axes : 051 014 043 degrees     QTc Int : 457 ms    Normal sinus rhythm  Normal ECG  No previous ECGs available  Confirmed by Yakov Vila MD ([id number]) on 3/2/2023 9:28:53 PM    Referred By:             Confirmed By:Yakov Vila MD         Physical Exam  Constitutional:       General: She is not in  acute distress.     Appearance: She is normal weight. She is not ill-appearing.   Cardiovascular:      Rate and Rhythm: Normal rate and regular rhythm.      Pulses: Normal pulses.      Heart sounds: No murmur heard.     Comments: No carotid bruit  Pulmonary:      Effort: Pulmonary effort is normal.      Breath sounds: Normal breath sounds. No wheezing.   Abdominal:      General: Bowel sounds are normal. There is no distension.      Palpations: Abdomen is soft.      Tenderness: There is no abdominal tenderness.   Musculoskeletal:      Right lower leg: No edema.      Left lower leg: No edema.   Skin:     General: Skin is warm and dry.   Neurological:      Mental Status: She is oriented to person, place, and time.   Psychiatric:         Mood and Affect: Mood normal.         Behavior: Behavior normal.         Judgment: Judgment normal.       Home Medications:   No current facility-administered medications on file prior to encounter.     Current Outpatient Medications on File Prior to Encounter   Medication Sig Dispense Refill    amLODIPine (NORVASC) 10 MG tablet Take 10 mg by mouth once daily.      cyclobenzaprine (FLEXERIL) 5 MG tablet Take 10 mg by mouth nightly as needed for Muscle spasms.      esomeprazole (NEXIUM) 40 MG capsule Take 40 mg by mouth before breakfast.      losartan (COZAAR) 100 MG tablet Take 100 mg by mouth once daily.      mirabegron (MYRBETRIQ) 50 mg Tb24 Take by mouth.      vitamin D (VITAMIN D3) 1000 units Tab Take 2,000 Units by mouth once daily.         Current Inpatient Medications:    Current Facility-Administered Medications:     acetaminophen tablet 650 mg, 650 mg, Oral, Q6H PRN, Mary Cerda MD, 650 mg at 03/04/23 2026    amLODIPine tablet 10 mg, 10 mg, Oral, Daily, Mary Cerda MD, 10 mg at 03/06/23 0923    aspirin EC tablet 81 mg, 81 mg, Oral, Daily, Savi Fritz, SADIE, 81 mg at 03/06/23 0923    atorvastatin tablet 40 mg, 40 mg, Oral, Daily, Mary Cerda MD, 40 mg at 03/06/23 0923     bisacodyL suppository 10 mg, 10 mg, Rectal, Daily PRN, Mary Cerda MD    calcium carbonate 200 mg calcium (500 mg) chewable tablet 1,000 mg, 1,000 mg, Oral, TID PRN, Beny Gamez MD    cyclobenzaprine tablet 10 mg, 10 mg, Oral, Nightly PRN, Mary Cerda MD    enoxaparin injection 40 mg, 40 mg, Subcutaneous, Daily, Mary Cerda MD, 40 mg at 03/05/23 1713    labetaloL injection 10 mg, 10 mg, Intravenous, Q30 Min PRN, Mary Cerda MD    losartan tablet 100 mg, 100 mg, Oral, Daily, Mary Cerda MD, 100 mg at 03/06/23 0923    ondansetron injection 4 mg, 4 mg, Intravenous, Q4H PRN, Mary Cerda MD, 4 mg at 03/04/23 2023    pantoprazole EC tablet 40 mg, 40 mg, Oral, Daily, Mary Cerda MD, 40 mg at 03/06/23 0923    polyethylene glycol packet 17 g, 17 g, Oral, BID PRN, Mary Cerda MD    prochlorperazine injection Soln 5 mg, 5 mg, Intravenous, Q6H PRN, Mary Cerda MD    senna-docusate 8.6-50 mg per tablet 1 tablet, 1 tablet, Oral, BID PRN, Mary Cerda MD    sodium chloride 0.9% flush 10 mL, 10 mL, Intravenous, PRN, Mayr Cerda MD    traZODone tablet 50 mg, 50 mg, Oral, QHS, Beny Gamez MD, 50 mg at 03/05/23 2058         VTE Risk Mitigation (From admission, onward)           Ordered     enoxaparin injection 40 mg  Daily         03/02/23 0120     Place sequential compression device  Until discontinued         03/02/23 0117                    Assessment:   Multiple Lacunar Infarcts    LVH  HTN  HLD  Small Pericardial Effusion- measured at end diastole  Tobacco Use      Plan:     Instructed to keep BP logs.   Needs aggressive BP control. If BP increases, recommended adding low dose B-blocker such as metoprolol.   Event monitor x 2 weeks.   Limited echo in 1 month  F/U with [facility] outpatient in [place] in 3 weeks      Thank you for your consult.     Ada Salinas MD  Cardiology  [facility] - 9th Floor Med Surg  03/06/2023 9:40 AM    I have reviewed and concur with the resident's history,  physical, assessment, and plan.  I have personally interviewed and examined the patient at bedside.

## 2023-03-07 LAB
BACTERIA CSF CULT: NORMAL
VDRL CSF QL: NEGATIVE

## 2023-03-09 DIAGNOSIS — H49.02 LEFT OCULOMOTOR NERVE PALSY: Primary | ICD-10-CM

## 2023-03-16 ENCOUNTER — OFFICE VISIT (OUTPATIENT)
Dept: OPHTHALMOLOGY | Facility: CLINIC | Age: 73
End: 2023-03-16
Payer: MEDICARE

## 2023-03-16 VITALS — BODY MASS INDEX: 31.58 KG/M2 | HEIGHT: 64 IN | WEIGHT: 185 LBS

## 2023-03-16 DIAGNOSIS — H40.001 GLAUCOMA SUSPECT, RIGHT: Primary | ICD-10-CM

## 2023-03-16 PROCEDURE — 92020 GONIOSCOPY: CPT | Mod: PBBFAC,PO,GC | Performed by: STUDENT IN AN ORGANIZED HEALTH CARE EDUCATION/TRAINING PROGRAM

## 2023-03-16 PROCEDURE — 92133 CPTRZD OPH DX IMG PST SGM ON: CPT | Mod: PBBFAC,PO,GC | Performed by: STUDENT IN AN ORGANIZED HEALTH CARE EDUCATION/TRAINING PROGRAM

## 2023-03-16 PROCEDURE — 92134 CPTRZ OPH DX IMG PST SGM RTA: CPT | Mod: PBBFAC,PO,GC | Performed by: STUDENT IN AN ORGANIZED HEALTH CARE EDUCATION/TRAINING PROGRAM

## 2023-03-16 PROCEDURE — 99213 OFFICE O/P EST LOW 20 MIN: CPT | Mod: PBBFAC,PO | Performed by: STUDENT IN AN ORGANIZED HEALTH CARE EDUCATION/TRAINING PROGRAM

## 2023-03-16 RX ORDER — DICLOFENAC SODIUM 10 MG/G
1 GEL TOPICAL DAILY PRN
COMMUNITY
Start: 2023-02-09 | End: 2023-06-09

## 2023-03-16 RX ORDER — METHYLPREDNISOLONE 4 MG/1
TABLET ORAL
COMMUNITY
Start: 2022-04-12

## 2023-03-16 RX ORDER — CIPROFLOXACIN 500 MG/1
500 TABLET ORAL EVERY 12 HOURS
COMMUNITY
Start: 2022-12-01

## 2023-03-16 RX ORDER — DIAZEPAM 5 MG/1
5 TABLET ORAL NIGHTLY
COMMUNITY
Start: 2023-03-07

## 2023-03-16 RX ORDER — LATANOPROST 50 UG/ML
1 SOLUTION/ DROPS OPHTHALMIC NIGHTLY
COMMUNITY
Start: 2023-03-12

## 2023-03-16 RX ORDER — GABAPENTIN 100 MG/1
1 CAPSULE ORAL
COMMUNITY
Start: 2022-04-12

## 2023-03-16 RX ORDER — PHENAZOPYRIDINE HYDROCHLORIDE 200 MG/1
200 TABLET, FILM COATED ORAL 3 TIMES DAILY
COMMUNITY
Start: 2022-12-01

## 2023-03-16 NOTE — PROGRESS NOTES
Assessment /Plan     For exam results, see Encounter Report.    Glaucoma suspect, right                       -------------------------  Carotid US, CTA unremarkable, TTE bubble study negative for shunt     3/3 MRI Orbit w/wo  FINDINGS:  Evaluation is limited by motion artifact.  The globes are normal in contour.  There are changes of prior bilateral cataract surgery.  The right optic nerve is slightly smaller than the left, with diffuse increased T2 signal.  There is no abnormal enhancement of the optic nerves.  There is no enhancing mass or inflammatory changes within the orbits.  The extraocular muscles are unremarkable.  The lacrimal glands are symmetric.  Meckel's caves and the cavernous sinuses are unremarkable.     Impression:     Evaluation limited by motion artifact.  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  Otherwise no acute abnormality of the orbits.    3/3 MRI Brain w/wo  FINDINGS:  There is no restricted diffusion, hemorrhage or edema.  Moderate scattered and patchy T2/FLAIR hyperintensities in the subcortical and periventricular white matter, basal ganglia and cerebellum, likely represent chronic microvascular ischemic changes, with multiple prior lacunar infarcts.  There is no abnormal parenchymal or leptomeningeal enhancement.     There is no mass effect or midline shift.  The basal cisterns are patent.  There is mild diffuse parenchymal volume loss.  There is no hydrocephalus or abnormal extra-axial fluid collection.  The major intracranial flow voids are patent.  The paranasal sinuses are clear.  There is trace right mastoid effusion.     Impression:     1. No acute intracranial abnormality.  2. Moderate chronic microvascular ischemic changes.   -------------------------------        CN3 palsy OS ?  - 3/2/23 presented to ED w/ 1 day of diplopia, weakness of left side. Trouble moving eye centrally. This dramatically improved over 2 weeks. Extensive workup as above was  negative. Likely ischemic.  - 3/16/23 no diplopia in ortho. Good EOM, trouble with prolonged SN and N gaze OS. Will CTM    2. Glaucoma suspect OS  - Asymmetric C/D (0.5 // 0.1)  - H/o trauma?  - OCT RNFL 3/23 64//78; red S/I // yellow S  - IOP off drops 22//16  - Goal 16 OU  - Angle recession component? Deeper angle diffusely OD on gonio 3/23  - Will start latanoprost and obtain VF, CCT next visit    3. ERM OD  4. PCO OS  - Monitor, can address after glaucoma workup complete    RTC 6 week IOP, HVF

## 2023-03-20 NOTE — DISCHARGE SUMMARY
[facility]  Hospital Medicine Discharge Summary    Admit Date: 3/1/2023  Discharge Date and Time: 3/6/2023 1:09 PM  Admitting Physician: Mary Cerda MD  Discharging Physician: Beny Gamez MD.  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     On arrival to ED she was afebrile, hypertensive, saturating 98% on room air.  EKG showed normal sinus rhythm.  Chest x-ray was unremarkable.  CT head showed left basal ganglia lacunar infarct of indeterminate age as well as multiple old lacunar infarcts involving the right basal ganglia, right external capsule and right peritrigonal location.  CTA head and neck showed no large vessel occlusion.  Labs unremarkable, A1c 5.2, lipid panel pending.     Patient was very promptly admitted to the hospital medicine service.    Bilateral carotid Doppler ultrasound was unrevealing.  Lumbar puncture was unrevealing.    CT of the thorax revealed a small pericardial effusion otherwise unremarkable.    MRI of the lumbar spine revealed significant spinal stenosis  MRI of the cervical spine revealed degenerative changes otherwise unremarkable.  MRI of the brain revealed no acute findings.    MRI of the orbits revealed findings suggestive of chronic right optic neuropathy.    Patient was evaluated by  Neurology who suspected the patient had microvascular third cranial nerve palsy which may improve with time.  They recommended that she follow-up Neurosurgery for lumbar spinal stenosis.      Patient reports that her left eye ophthalmoplegia continues to improve.  Multiple family members are present visiting.  One of the patient's daughters requested a cardiology evaluation.  Cardiology recommended patient to keep blood pressure logs and that she needs aggressive blood pressure control.  If her blood pressure increases they recommended adding a low-dose beta-blocker such as metoprolol.  They recommended that the patient wear an event monitor for 2 weeks and have a limited echocardiogram in 1 month with follow-up at the [facility] in [place] in 3 weeks.  Patient was discharged home in stable and improved condition.      Patient was seen and examined on the day of discharge.      Vitals:  VITAL SIGNS: 24 HRS MIN & MAX LAST   No data recorded 98 °F (36.7 °C)   No data recorded 121/74   No data recorded  94   No data recorded 20   No data recorded 98 %       Physical Exam:  General: in no acute distress  HENT: normocephalic, atraumatic  Eye:  Left eye ophthalmoplegia with deviation to the left  Neck: full ROM, no thyromegaly, no JVD  Respiratory: clear to auscultation bilaterally  Cardiovascular: regular rate and rhythm  Gastrointestinal: non-distended, positive bowel sounds, non-tender  Musculoskeletal: no gross deformity  Integumentary: warm, dry, intact, no rashes  Neurological: cranial nerves grossly intact, bilateral lower extremity paresis worse on the left  Psychiatric: cooperative, flat affect, anxious, depressed    Procedures Performed: No admission procedures for hospital encounter.     Significant Diagnostic Studies: See Full reports for all details    No results for input(s): WBC, RBC, HGB, HCT, MCV, MCH, MCHC, RDW, PLT, MPV, GRAN, LYMPH, MONO, BASO, NRBC in the last 168 hours.    No results for input(s):  NA, K, CL, CO2, ANIONGAP, BUN, CREATININE, GLU, CALCIUM, PH, MG, ALBUMIN, PROT, ALKPHOS, ALT, AST, BILITOT in the last 168 hours.     Microbiology Results (last 7 days)       Procedure Component Value Units Date/Time    Carol Ink Prep CSF [[id number]] Collected: 03/02/23 1607    Order Status: Completed Specimen: CSF (Spinal Fluid) from Cerebrospinal Fluid Updated: 03/02/23 1821     CAROL INK PREP CSF ([facility]) Negative    Gram Stain [[id number]] Collected: 03/02/23 1607    Order Status: Completed Specimen: CSF (Spinal Fluid) from Lumbar Updated: 03/02/23 1820     GRAM STAIN No WBCs, No bacteria seen    Fungal Culture [[id number]] Collected: 03/02/23 1607    Order Status: Resulted Specimen: CSF (Spinal Fluid) from Lumbar Updated: 03/02/23 1644    Cryptococcal antigen, CSF [[id number]] Collected: 03/02/23 1607    Order Status: Canceled Specimen: CSF (Spinal Fluid) from CSF Tap, Tube 1 Updated: 03/02/23 1643             MRI Orbits W W/O Contrast  Narrative: EXAMINATION:  MRI ORBITS W W/O CONTRAST    CLINICAL HISTORY:  Ophthalmoplegia;    TECHNIQUE:  Multiplanar, multisequence MR images of the orbits were obtained with and without administration of intravenous contrast.    COMPARISON:  MRI brain from the same day    FINDINGS:  Evaluation is limited by motion artifact.  The globes are normal in contour.  There are changes of prior bilateral cataract surgery.  The right optic nerve is slightly smaller than the left, with diffuse increased T2 signal.  There is no abnormal enhancement of the optic nerves.  There is no enhancing mass or inflammatory changes within the orbits.  The extraocular muscles are unremarkable.  The lacrimal glands are symmetric.  Meckel's caves and the cavernous sinuses are unremarkable.  Impression: Evaluation limited by motion artifact.  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  Otherwise no acute abnormality of the orbits.    Electronically signed by: Sakshi  Davon  Date:    03/04/2023  Time:    11:06  MRI Brain W WO Contrast  Narrative: EXAMINATION:  MRI BRAIN W WO CONTRAST    CLINICAL HISTORY:  Stroke, follow up;    TECHNIQUE:  Multiplanar, multisequence MR images of the brain were obtained with and without administration of intravenous contrast.    COMPARISON:  MRI brain dated 03/02/2023    FINDINGS:  There is no restricted diffusion, hemorrhage or edema.  Moderate scattered and patchy T2/FLAIR hyperintensities in the subcortical and periventricular white matter, basal ganglia and cerebellum, likely represent chronic microvascular ischemic changes, with multiple prior lacunar infarcts.  There is no abnormal parenchymal or leptomeningeal enhancement.    There is no mass effect or midline shift.  The basal cisterns are patent.  There is mild diffuse parenchymal volume loss.  There is no hydrocephalus or abnormal extra-axial fluid collection.  The major intracranial flow voids are patent.  The paranasal sinuses are clear.  There is trace right mastoid effusion.  Impression: 1. No acute intracranial abnormality.  2. Moderate chronic microvascular ischemic changes.    Electronically signed by: Sakshi Mays  Date:    03/04/2023  Time:    11:01         Medication List        START taking these medications      aspirin 81 MG EC tablet  Commonly known as: ECOTRIN  Take 1 tablet (81 mg total) by mouth once daily.     atorvastatin 40 MG tablet  Commonly known as: LIPITOR  Take 1 tablet (40 mg total) by mouth once daily.            CONTINUE taking these medications      amLODIPine 10 MG tablet  Commonly known as: NORVASC     cyclobenzaprine 5 MG tablet  Commonly known as: FLEXERIL     esomeprazole 40 MG capsule  Commonly known as: NEXIUM     losartan 100 MG tablet  Commonly known as: COZAAR     MYRBETRIQ 50 mg Tb24  Generic drug: mirabegron     vitamin D 1000 units Tab  Commonly known as: VITAMIN D3               Where to Get Your Medications        These medications were  sent to [facility] - [place], [place] - [address]  [address] [place] [address]      Phone: [phone]   aspirin 81 MG EC tablet  atorvastatin 40 MG tablet          Explained in detail to the patient about the discharge plan, medications, and follow-up visits.  Patient understands and agrees with the treatment plan.  [place] [facility]   Discharged Condition: stable  Diet: Heart healthy   Contact information for follow-up providers       Meera Zazueta MD Follow up in 2 week(s).    Specialty: Neurosurgery  Why: 3/6 13:08-Office will contact [facility] with appointennt.  Contact information:  [address]  [place] [id number] [phone]               Leticia Cody MD Follow up in 2 day(s).    Specialty: Neurology  Why: Office will contact giana with appointment time and date.  Contact information:  [address] [place] [address] [address]  [place] [id number] [phone]               Rachid Smith MD Follow up on 3/21/2023.    Specialties: Cardiovascular Disease, Interventional Cardiology, Cardiology  Why: [place] location. 2 week MCT dx CVA. results on f/u with Dr. Smith     Appointnment date is March @11:10  Contact information:  [address]  [facility] [address]  [phone]                       Contact information for after-discharge care       Home Medical Care       [facility] .    Service: Home Health Services  Contact information:  [address]  [facility] [address]  [phone]                                 For further questions contact your home health agency    Discharge time 35 minutes    For worsening symptoms, chest pain, shortness of breath, increased abdominal pain, high grade fever, stroke or stroke like symptoms, immediately go to the nearest Emergency Room or call 911 as soon as possible.      Beny Salcido M.D, on 3/20/2023. at 3:08 PM.

## 2023-04-03 LAB — FUNGUS SPEC CULT: NORMAL

## 2023-04-04 ENCOUNTER — OFFICE VISIT (OUTPATIENT)
Dept: NEUROLOGY | Facility: CLINIC | Age: 73
End: 2023-04-04
Payer: MEDICARE

## 2023-04-04 VITALS
SYSTOLIC BLOOD PRESSURE: 132 MMHG | HEART RATE: 90 BPM | WEIGHT: 196 LBS | BODY MASS INDEX: 33.46 KG/M2 | DIASTOLIC BLOOD PRESSURE: 86 MMHG | HEIGHT: 64 IN

## 2023-04-04 DIAGNOSIS — I48.91 ATRIAL FIBRILLATION, UNSPECIFIED TYPE: ICD-10-CM

## 2023-04-04 DIAGNOSIS — H49.00 OCULOMOTOR NERVE PALSY, UNSPECIFIED LATERALITY: Primary | ICD-10-CM

## 2023-04-04 PROCEDURE — 3288F FALL RISK ASSESSMENT DOCD: CPT | Mod: CPTII,S$GLB,, | Performed by: NURSE PRACTITIONER

## 2023-04-04 PROCEDURE — 1111F PR DISCHARGE MEDS RECONCILED W/ CURRENT OUTPATIENT MED LIST: ICD-10-PCS | Mod: CPTII,S$GLB,, | Performed by: NURSE PRACTITIONER

## 2023-04-04 PROCEDURE — 99999 PR PBB SHADOW E&M-EST. PATIENT-LVL IV: ICD-10-PCS | Mod: PBBFAC,,, | Performed by: NURSE PRACTITIONER

## 2023-04-04 PROCEDURE — 3075F SYST BP GE 130 - 139MM HG: CPT | Mod: CPTII,S$GLB,, | Performed by: NURSE PRACTITIONER

## 2023-04-04 PROCEDURE — 3008F BODY MASS INDEX DOCD: CPT | Mod: CPTII,S$GLB,, | Performed by: NURSE PRACTITIONER

## 2023-04-04 PROCEDURE — 1101F PT FALLS ASSESS-DOCD LE1/YR: CPT | Mod: CPTII,S$GLB,, | Performed by: NURSE PRACTITIONER

## 2023-04-04 PROCEDURE — 1159F MED LIST DOCD IN RCRD: CPT | Mod: CPTII,S$GLB,, | Performed by: NURSE PRACTITIONER

## 2023-04-04 PROCEDURE — 1126F AMNT PAIN NOTED NONE PRSNT: CPT | Mod: CPTII,S$GLB,, | Performed by: NURSE PRACTITIONER

## 2023-04-04 PROCEDURE — 4010F ACE/ARB THERAPY RXD/TAKEN: CPT | Mod: CPTII,S$GLB,, | Performed by: NURSE PRACTITIONER

## 2023-04-04 PROCEDURE — 1159F PR MEDICATION LIST DOCUMENTED IN MEDICAL RECORD: ICD-10-PCS | Mod: CPTII,S$GLB,, | Performed by: NURSE PRACTITIONER

## 2023-04-04 PROCEDURE — 1111F DSCHRG MED/CURRENT MED MERGE: CPT | Mod: CPTII,S$GLB,, | Performed by: NURSE PRACTITIONER

## 2023-04-04 PROCEDURE — 99999 PR PBB SHADOW E&M-EST. PATIENT-LVL IV: CPT | Mod: PBBFAC,,, | Performed by: NURSE PRACTITIONER

## 2023-04-04 PROCEDURE — 4010F PR ACE/ARB THEARPY RXD/TAKEN: ICD-10-PCS | Mod: CPTII,S$GLB,, | Performed by: NURSE PRACTITIONER

## 2023-04-04 PROCEDURE — 3288F PR FALLS RISK ASSESSMENT DOCUMENTED: ICD-10-PCS | Mod: CPTII,S$GLB,, | Performed by: NURSE PRACTITIONER

## 2023-04-04 PROCEDURE — 3008F PR BODY MASS INDEX (BMI) DOCUMENTED: ICD-10-PCS | Mod: CPTII,S$GLB,, | Performed by: NURSE PRACTITIONER

## 2023-04-04 PROCEDURE — 1126F PR PAIN SEVERITY QUANTIFIED, NO PAIN PRESENT: ICD-10-PCS | Mod: CPTII,S$GLB,, | Performed by: NURSE PRACTITIONER

## 2023-04-04 PROCEDURE — 99214 OFFICE O/P EST MOD 30 MIN: CPT | Mod: S$GLB,,, | Performed by: NURSE PRACTITIONER

## 2023-04-04 PROCEDURE — 3079F PR MOST RECENT DIASTOLIC BLOOD PRESSURE 80-89 MM HG: ICD-10-PCS | Mod: CPTII,S$GLB,, | Performed by: NURSE PRACTITIONER

## 2023-04-04 PROCEDURE — 3075F PR MOST RECENT SYSTOLIC BLOOD PRESS GE 130-139MM HG: ICD-10-PCS | Mod: CPTII,S$GLB,, | Performed by: NURSE PRACTITIONER

## 2023-04-04 PROCEDURE — 3079F DIAST BP 80-89 MM HG: CPT | Mod: CPTII,S$GLB,, | Performed by: NURSE PRACTITIONER

## 2023-04-04 PROCEDURE — 1101F PR PT FALLS ASSESS DOC 0-1 FALLS W/OUT INJ PAST YR: ICD-10-PCS | Mod: CPTII,S$GLB,, | Performed by: NURSE PRACTITIONER

## 2023-04-04 PROCEDURE — 99214 PR OFFICE/OUTPT VISIT, EST, LEVL IV, 30-39 MIN: ICD-10-PCS | Mod: S$GLB,,, | Performed by: NURSE PRACTITIONER

## 2023-04-04 RX ORDER — CALCIUM CARB/MAGNESIUM CARB 311-232MG
1 TABLET ORAL DAILY PRN
COMMUNITY

## 2023-04-04 NOTE — PROGRESS NOTES
Subjective:       Patient ID: Porsche Alston is a 72 y.o. female.    Chief Complaint: Diplopia (Was admitted to [facility] on 2023 for double vision. If she turns to the right it is difficult for her eyes to focus. )      History of Present Illness:  Hospital follow-up visit for microvascular 3rd nerve palsy.  She tells me that her symptoms have significantly improved since her hospitalization.  The diplopia has essentially completely resolved.  She now only has some minor difficulty when she looks to the right in her peripheral vision.  She says things are not clear when she looks to the right, but ultimately she is able to focus her vision.  She is now seeing neuro ophthalmologist, Dr. Sylvester.  She will follow up with her again in about 6 weeks to discuss the possibility of prism glasses.  She is also now on Eliquis.  On discharge, cardiology ordered a Holter monitor which captured atrial fibrillation.  She tells me Dr. Victoria started her on Eliquis yesterday for this, but he did not tell her to stop aspirin so she is wondering if she needs to continue that.  She denies any new symptoms including facial droop, focal weakness, focal paresthesias.      Review of Systems  I have reviewed a 12 point review of systems which is negative unless otherwise stated in the HPI        Past Medical History:   Diagnosis Date    Acid reflux     Glaucoma     Hypertension     Mixed hyperlipidemia     Stroke     Unspecified urinary incontinence        Past Surgical History:   Procedure Laterality Date    CATARACT EXTRACTION W/ INTRAOCULAR LENS  IMPLANT, BILATERAL Bilateral     HYSTERECTOMY      Not sure date    SPINE SURGERY      Back surgery        Family History   Problem Relation Age of Onset    Diabetes Mother             Hypertension Mother     Glaucoma Father     Cancer Father         Prostate Cancer-    Vision loss Father     Heart disease Brother         Emeka Morillo Jr-, Anoop Morillo-     "Heart attack Brother         Social History     Socioeconomic History    Marital status:    Tobacco Use    Smoking status: Some Days     Types: Cigarettes    Smokeless tobacco: Never   Substance and Sexual Activity    Alcohol use: Not Currently    Drug use: Not Currently    Sexual activity: Not Currently        Outpatient Encounter Medications as of 4/4/2023   Medication Sig Dispense Refill    amLODIPine (NORVASC) 10 MG tablet Take 10 mg by mouth once daily.      apixaban (ELIQUIS) 5 mg Tab Take 5 mg by mouth 2 (two) times daily.      aspirin (ECOTRIN) 81 MG EC tablet Take 1 tablet (81 mg total) by mouth once daily. 100 tablet 0    atorvastatin (LIPITOR) 40 MG tablet Take 1 tablet (40 mg total) by mouth once daily. 90 tablet 5    diazePAM (VALIUM) 5 MG tablet Take 5 mg by mouth every evening.      diclofenac sodium (VOLTAREN) 1 % Gel 1 application daily as needed.      esomeprazole (NEXIUM) 40 MG capsule Take 40 mg by mouth before breakfast.      latanoprost 0.005 % ophthalmic solution Place 1 drop into both eyes every evening.      losartan (COZAAR) 100 MG tablet Take 100 mg by mouth once daily.      melatonin 5 mg TbDL Take 1 tablet by mouth daily as needed.      mirabegron (MYRBETRIQ) 50 mg Tb24 Take 1 tablet by mouth Daily.      vitamin D (VITAMIN D3) 1000 units Tab Take 2,000 Units by mouth once daily.      ciprofloxacin HCl (CIPRO) 500 MG tablet Take 500 mg by mouth every 12 (twelve) hours.      cyclobenzaprine (FLEXERIL) 5 MG tablet Take 10 mg by mouth nightly as needed for Muscle spasms.      gabapentin (NEURONTIN) 100 MG capsule 1 capsule.      methylPREDNISolone (MEDROL DOSEPACK) 4 mg tablet Zackary as directed      phenazopyridine (PYRIDIUM) 200 MG tablet Take 200 mg by mouth 3 (three) times daily.       No facility-administered encounter medications on file as of 4/4/2023.      Objective:   /86 (BP Location: Left arm)   Pulse 90   Ht 5' 4" (1.626 m)   Wt 88.9 kg (196 lb)   BMI 33.64 kg/m² "        Physical Exam  General:  Alert and oriented  NAD  No overt edema    Cognition and Comprehension:  Speech and language intact  Follows commands    Cranial nerves:   CN 2_ Visual fields (full to confrontation both eyes)  CN 3, 4, 6_ Intact, KIM, no nystagmus  CN 5_facial tactile sensation intact  CN 7_no face asymmetry; normal eye closure and smile  CN 8_hearing intact to spoken voice  CN 9, 10, 11_voice normal, shoulder shrug ok; deltoids not fatigable   CN 12 tongue_protrudes mid line    Muscle Strength and Tone:  Normal upper extremity tone  Normal lower extremity tone  Normal upper extremity strength  Normal lower extremity strength    Coordination and Gait:  Coordination and gait are normal   No ataxia with FTN      Assessment & Plan:      1. Oculomotor nerve palsy, unspecified laterality  Overview:  Presented to ED on 03/01/2023 for diplopia and generalized weakness as well as dragging the left side for quite some time.  She has a past medical history of hypertension and tobacco use.  She was admitted for a stroke workup.  MRI brain was negative for acute infarct, but did show multiple old infarcts.  MRI orbits showed chronic right optic neuropathy.  CTA head and neck was negative for flow-limiting stenosis, TTE with EF 65% and negative bubble study, , A1c 5.2, TSH 6.9.  She also underwent lumbar puncture while inpatient and CSF was unremarkable.  It was ultimately felt that her monocular diplopia was a result of microvascular 3rd nerve palsy.  She was not on any antiplatelet therapy or statin therapy at home.  She was started on aspirin 81 mg and Lipitor 40 mg.      Assessment & Plan:  -continue Eliquis and Lipitor for secondary prevention measures.  Instructed to stop aspirin  -discussed smoking cessation.  Diet and exercise encouraged  -continue following up with Dr. Adriana Sylvester, Neuro-Ophthalmology  -can follow-up here in 6 months and then will likely be able to return p.r.n.      2.  Atrial fibrillation, unspecified type  Assessment & Plan:  -get records from Dr. Victoria for review  -now on Eliquis so instructed to stop aspirin            This note was created with the assistance of voice recognition software. There may be transcription errors as a result of using this technology however minimal. Effort has been made to assure accuracy of transcription but any obvious errors or omissions should be clarified with the author of the document.

## 2023-04-04 NOTE — ASSESSMENT & PLAN NOTE
-continue Eliquis and Lipitor for secondary prevention measures.  Instructed to stop aspirin  -discussed smoking cessation.  Diet and exercise encouraged  -continue following up with Dr. Adriana Sylvester, Neuro-Ophthalmology  -can follow-up here in 6 months and then will likely be able to return p.r.n.